# Patient Record
Sex: FEMALE | Race: WHITE | Employment: UNEMPLOYED | URBAN - METROPOLITAN AREA
[De-identification: names, ages, dates, MRNs, and addresses within clinical notes are randomized per-mention and may not be internally consistent; named-entity substitution may affect disease eponyms.]

---

## 2017-01-10 DIAGNOSIS — F41.9 ANXIETY: ICD-10-CM

## 2017-01-10 RX ORDER — ESCITALOPRAM OXALATE 20 MG/1
TABLET ORAL
Qty: 30 TAB | Refills: 3 | Status: SHIPPED | OUTPATIENT
Start: 2017-01-10 | End: 2017-03-30 | Stop reason: SDUPTHER

## 2017-03-30 ENCOUNTER — OFFICE VISIT (OUTPATIENT)
Dept: INTERNAL MEDICINE CLINIC | Age: 52
End: 2017-03-30

## 2017-03-30 VITALS
TEMPERATURE: 99.4 F | SYSTOLIC BLOOD PRESSURE: 112 MMHG | DIASTOLIC BLOOD PRESSURE: 82 MMHG | RESPIRATION RATE: 16 BRPM | HEART RATE: 75 BPM | WEIGHT: 136.8 LBS | BODY MASS INDEX: 20.26 KG/M2 | HEIGHT: 69 IN | OXYGEN SATURATION: 98 %

## 2017-03-30 DIAGNOSIS — J30.1 SEASONAL ALLERGIC RHINITIS DUE TO POLLEN: ICD-10-CM

## 2017-03-30 DIAGNOSIS — F41.9 ANXIETY: Primary | ICD-10-CM

## 2017-03-30 DIAGNOSIS — Z13.9 SCREENING: ICD-10-CM

## 2017-03-30 RX ORDER — PROPRANOLOL HYDROCHLORIDE 10 MG/1
10 TABLET ORAL
Qty: 30 TAB | Refills: 0 | Status: SHIPPED | OUTPATIENT
Start: 2017-03-30 | End: 2018-06-07 | Stop reason: ALTCHOICE

## 2017-03-30 RX ORDER — BISMUTH SUBSALICYLATE 262 MG
1 TABLET,CHEWABLE ORAL DAILY
COMMUNITY

## 2017-03-30 RX ORDER — CLONAZEPAM 0.5 MG/1
TABLET ORAL
Qty: 30 TAB | Refills: 3 | Status: SHIPPED | OUTPATIENT
Start: 2017-03-30 | End: 2017-10-02 | Stop reason: SDUPTHER

## 2017-03-30 RX ORDER — ALPRAZOLAM 0.5 MG/1
0.5 TABLET ORAL
Qty: 20 TAB | Refills: 0 | Status: SHIPPED | OUTPATIENT
Start: 2017-03-30 | End: 2017-11-21 | Stop reason: SDUPTHER

## 2017-03-30 RX ORDER — ESCITALOPRAM OXALATE 20 MG/1
TABLET ORAL
Qty: 30 TAB | Refills: 5 | Status: SHIPPED | OUTPATIENT
Start: 2017-03-30 | End: 2017-09-05 | Stop reason: SDUPTHER

## 2017-03-30 NOTE — MR AVS SNAPSHOT
Visit Information Date & Time Provider Department Dept. Phone Encounter #  
 3/30/2017  2:45 PM Sabrina Mosley MD [facility] [address] [id number] Upcoming Health Maintenance Date Due FOBT Q 1 YEAR AGE 50-75 9/11/2016 BREAST CANCER SCRN MAMMOGRAM 8/8/2018 PAP AKA CERVICAL CYTOLOGY 9/27/2019 DTaP/Tdap/Td series (2 - Td) 5/17/2026 Allergies as of 3/30/2017  Review Complete On: 3/30/2017 By: Sabrina Mosley MD  
  
 Severity Noted Reaction Type Reactions Sulfa (Sulfonamide Antibiotics)  02/19/2010    Hives Vancomycin  02/19/2010    Rash Current Immunizations  Reviewed on 5/17/2016 Name Date Tdap 5/17/2016 Not reviewed this visit You Were Diagnosed With   
  
 Codes Comments Screening    -  Primary ICD-10-CM: Z13.9 ICD-9-CM: V82.9 Anxiety     ICD-10-CM: F41.9 ICD-9-CM: 300.00 Vitals BP Pulse Temp Resp Height(growth percentile) Weight(growth percentile) 112/82 (BP 1 Location: Left arm, BP Patient Position: Sitting) 75 99.4 °F (37.4 °C) (Oral) 16 5' 9\" (1.753 m) 136 lb 12.8 oz (62.1 kg) LMP SpO2 BMI OB Status Smoking Status 03/23/2017 (Approximate) 98% 20.2 kg/m2 Having regular periods Never Smoker Vitals History BMI and BSA Data Body Mass Index Body Surface Area  
 20.2 kg/m 2 1.74 m 2 Preferred Pharmacy [facility] [address] [phone] Your Updated Medication List  
  
   
This list is accurate as of: 3/30/17  3:27 PM.  Always use your most recent med list. ACZONE 5 % Gel Generic drug:  Dapsone  
by Apply Externally route. albuterol 90 mcg/actuation inhaler Commonly known as:  PROVENTIL HFA, VENTOLIN HFA, PROAIR HFA Take 2 Puffs by inhalation every six (6) hours as needed for Wheezing for 360 days. ALPRAZolam 0.5 mg tablet Commonly known as:  Gopal Flores Take 1 Tab by mouth daily as needed for Anxiety. clonazePAM 0.5 mg tablet Commonly known as:  KlonoPIN  
TAKE ONE TABLET BY MOUTH ONE TIME DAILY FOR 30 DAYS  
  
 doxycycline hyclate 100 mg Tbec Take  by mouth as needed. escitalopram oxalate 20 mg tablet Commonly known as:  Vani Rivas TAKE ONE TABLET BY MOUTH ONE TIME DAILY  
  
 multivitamin tablet Commonly known as:  ONE A DAY Take 1 Tab by mouth daily. propranolol 10 mg tablet Commonly known as:  INDERAL Take 1 Tab by mouth daily as needed for up to 30 doses. TAZORAC 0.1 % topical cream  
Generic drug:  tazorotene Prescriptions Printed Refills  
 clonazePAM (KLONOPIN) 0.5 mg tablet 3 Sig: TAKE ONE TABLET BY MOUTH ONE TIME DAILY FOR 30 DAYS Class: Print ALPRAZolam (XANAX) 0.5 mg tablet 0 Sig: Take 1 Tab by mouth daily as needed for Anxiety. Class: Print Route: Oral  
  
Prescriptions Sent to Pharmacy Refills  
 escitalopram oxalate (LEXAPRO) 20 mg tablet 5 Sig: TAKE ONE TABLET BY MOUTH ONE TIME DAILY Class: Normal  
 Pharmacy: [facility] [address] [place] [address] Ph #: [phone]  
 propranolol (INDERAL) 10 mg tablet 0 Sig: Take 1 Tab by mouth daily as needed for up to 30 doses. Class: Normal  
 Pharmacy: [facility] [address] [place] [address] Ph #: [phone] Route: Oral  
  
We Performed the Following CBC WITH AUTOMATED DIFF [65107 CPT(R)] LIPID PANEL [54933 CPT(R)] METABOLIC PANEL, COMPREHENSIVE [03201 CPT(R)] TSH 3RD GENERATION [30607 CPT(R)] Introducing [facility]! Wanda Cassidy introduces [employer] patient portal. Now you can access parts of your medical record, email your doctor's office, and request medication refills online. 1. In your internet browser, go to [email or web address] 2. Click on the First Time User? Click Here link in the Sign In box.  You will see the New Member Sign Up page. 3. Enter your [employer] Access Code exactly as it appears below. You will not need to use this code after youve completed the sign-up process. If you do not sign up before the expiration date, you must request a new code. · [employer] Access Code: [email or web address] Expires: 6/28/2017  3:27 PM 
 
4. Enter the last four digits of your Social Security Number (xxxx) and Date of Birth (mm/dd/yyyy) as indicated and click Submit. You will be taken to the next sign-up page. 5. Create a Koalityt ID. This will be your [employer] login ID and cannot be changed, so think of one that is secure and easy to remember. 6. Create a [employer] password. You can change your password at any time. 7. Enter your Password Reset Question and Answer. This can be used at a later time if you forget your password. 8. Enter your e-mail address. You will receive e-mail notification when new information is available in [address] 9. Click Sign Up. You can now view and download portions of your medical record. 10. Click the Download Summary menu link to download a portable copy of your medical information. If you have questions, please visit the Frequently Asked Questions section of the [employer] website. Remember, [employer] is NOT to be used for urgent needs. For medical emergencies, dial 911. Now available from your iPhone and Android! Please provide this summary of care documentation to your next provider. Your primary care clinician is listed as Cedrick 68. If you have any questions after today's visit, please call [phone].

## 2017-03-30 NOTE — PROGRESS NOTES
HISTORY OF PRESENT ILLNESS  Alvaro Bowen is a 49 y.o. female. JINA Wilkerson comes in for med check. On reviewing her chart, for the last two years and multiple lab orders she's never gone to have blood drawn. Vinayak Unger discussion today that it is important to me that she get the labs drawn so that I can monitor her kidney and liver function. She refuses to do it today, but promises that she will go within the next week. Again, herman discussion that if she does not have it drawn I will not be able to continue to prescribe her meds.  is reviewed. Meds have only been prescribed by me over the last year and she has gone through 30 Klonopin every month and a half roughly. Last 20 Xanax were filled on 10/03/16 and she still has three pills left. She is taking the Lexapro 20 a day and feels that it is overall helpful. She has a sensation of globus in the back of her throat and some allergies and postnasal drainage. Review of Systems   Constitutional: Positive for malaise/fatigue. Negative for fever. Neurological: Negative for headaches. Endo/Heme/Allergies: Positive for environmental allergies. Psychiatric/Behavioral: Negative for depression. The patient is nervous/anxious. The patient does not have insomnia. Physical Exam   Constitutional: She appears well-developed and well-nourished. HENT:   Head: Normocephalic. Right Ear: Tympanic membrane, external ear and ear canal normal.   Left Ear: Tympanic membrane, external ear and ear canal normal.   Nose: Nose normal.   Mouth/Throat: Mucous membranes are normal. No oropharyngeal exudate. Some evidence of post nasal drainage   Eyes: Conjunctivae are normal. Pupils are equal, round, and reactive to light. Right eye exhibits no discharge. Left eye exhibits no discharge. Neck: Normal range of motion. Neck supple. Cardiovascular: Normal rate, regular rhythm and normal heart sounds.     Pulmonary/Chest: Effort normal and breath sounds normal. No respiratory distress. She has no wheezes. She has no rales. Lymphadenopathy:     She has no cervical adenopathy. Skin: Skin is warm and dry. Psychiatric: She has a normal mood and affect. Her behavior is normal.   Nursing note and vitals reviewed. ASSESSMENT and PLAN  Rita Vasquez was seen today for medication evaluation. Diagnoses and all orders for this visit:    Screening  -     METABOLIC PANEL, COMPREHENSIVE  -     LIPID PANEL  -     CBC WITH AUTOMATED DIFF  -     TSH 3RD GENERATION    Anxiety  -     escitalopram oxalate (LEXAPRO) 20 mg tablet; TAKE ONE TABLET BY MOUTH ONE TIME DAILY  -     clonazePAM (KLONOPIN) 0.5 mg tablet; TAKE ONE TABLET BY MOUTH ONE TIME DAILY FOR 30 DAYS  -     ALPRAZolam (XANAX) 0.5 mg tablet; Take 1 Tab by mouth daily as needed for Anxiety. -     propranolol (INDERAL) 10 mg tablet; Take 1 Tab by mouth daily as needed for up to 30 doses. Seasonal allergic rhinitis due to pollen    [facility] to use otc allegra  appt in 4 moThis note will not be viewable in UseTogetherhart.

## 2017-05-25 ENCOUNTER — TELEPHONE (OUTPATIENT)
Dept: INTERNAL MEDICINE CLINIC | Age: 52
End: 2017-05-25

## 2017-05-25 DIAGNOSIS — D72.819 LEUKOPENIA, UNSPECIFIED TYPE: Primary | ICD-10-CM

## 2017-05-25 LAB
ALBUMIN SERPL-MCNC: 4.2 G/DL (ref 3.5–5.5)
ALBUMIN/GLOB SERPL: 1.8 {RATIO} (ref 1.2–2.2)
ALP SERPL-CCNC: 37 IU/L (ref 39–117)
ALT SERPL-CCNC: 7 IU/L (ref 0–32)
AST SERPL-CCNC: 14 IU/L (ref 0–40)
BASOPHILS # BLD AUTO: 0 X10E3/UL (ref 0–0.2)
BASOPHILS NFR BLD AUTO: 0 %
BILIRUB SERPL-MCNC: 0.8 MG/DL (ref 0–1.2)
BUN SERPL-MCNC: 12 MG/DL (ref 6–24)
BUN/CREAT SERPL: 14 (ref 9–23)
CALCIUM SERPL-MCNC: 9.3 MG/DL (ref 8.7–10.2)
CHLORIDE SERPL-SCNC: 98 MMOL/L (ref 96–106)
CHOLEST SERPL-MCNC: 196 MG/DL (ref 100–199)
CO2 SERPL-SCNC: 25 MMOL/L (ref 18–29)
CREAT SERPL-MCNC: 0.83 MG/DL (ref 0.57–1)
EOSINOPHIL # BLD AUTO: 0.1 X10E3/UL (ref 0–0.4)
EOSINOPHIL NFR BLD AUTO: 3 %
ERYTHROCYTE [DISTWIDTH] IN BLOOD BY AUTOMATED COUNT: 15.5 % (ref 12.3–15.4)
GLOBULIN SER CALC-MCNC: 2.4 G/DL (ref 1.5–4.5)
GLUCOSE SERPL-MCNC: 87 MG/DL (ref 65–99)
HCT VFR BLD AUTO: 32 % (ref 34–46.6)
HDLC SERPL-MCNC: 107 MG/DL
HGB BLD-MCNC: 10 G/DL (ref 11.1–15.9)
IMM GRANULOCYTES # BLD: 0 X10E3/UL (ref 0–0.1)
IMM GRANULOCYTES NFR BLD: 0 %
INTERPRETATION, 910389: NORMAL
LDLC SERPL CALC-MCNC: 75 MG/DL (ref 0–99)
LYMPHOCYTES # BLD AUTO: 1.2 X10E3/UL (ref 0.7–3.1)
LYMPHOCYTES NFR BLD AUTO: 47 %
MCH RBC QN AUTO: 27 PG (ref 26.6–33)
MCHC RBC AUTO-ENTMCNC: 31.3 G/DL (ref 31.5–35.7)
MCV RBC AUTO: 87 FL (ref 79–97)
MONOCYTES # BLD AUTO: 0.4 X10E3/UL (ref 0.1–0.9)
MONOCYTES NFR BLD AUTO: 16 %
MORPHOLOGY BLD-IMP: ABNORMAL
NEUTROPHILS # BLD AUTO: 0.9 X10E3/UL (ref 1.4–7)
NEUTROPHILS NFR BLD AUTO: 34 %
PLATELET # BLD AUTO: 207 X10E3/UL (ref 150–379)
POTASSIUM SERPL-SCNC: 4.1 MMOL/L (ref 3.5–5.2)
PROT SERPL-MCNC: 6.6 G/DL (ref 6–8.5)
RBC # BLD AUTO: 3.7 X10E6/UL (ref 3.77–5.28)
SODIUM SERPL-SCNC: 137 MMOL/L (ref 134–144)
TRIGL SERPL-MCNC: 71 MG/DL (ref 0–149)
TSH SERPL DL<=0.005 MIU/L-ACNC: 6.01 UIU/ML (ref 0.45–4.5)
VLDLC SERPL CALC-MCNC: 14 MG/DL (ref 5–40)
WBC # BLD AUTO: 2.5 X10E3/UL (ref 3.4–10.8)

## 2017-05-26 ENCOUNTER — TELEPHONE (OUTPATIENT)
Dept: INTERNAL MEDICINE CLINIC | Age: 52
End: 2017-05-26

## 2017-05-30 ENCOUNTER — TELEPHONE (OUTPATIENT)
Dept: INTERNAL MEDICINE CLINIC | Age: 52
End: 2017-05-30

## 2017-05-30 NOTE — TELEPHONE ENCOUNTER
Pt dropped off form for presciption mattress to be sogned by Dr Sonali Lopez. Pt will  from office when ready. I placed form with note attached in [facility] 96..

## 2017-05-31 ENCOUNTER — TELEPHONE (OUTPATIENT)
Dept: INTERNAL MEDICINE CLINIC | Age: 52
End: 2017-05-31

## 2017-05-31 LAB
BASOPHILS # BLD AUTO: 0 X10E3/UL (ref 0–0.2)
BASOPHILS NFR BLD AUTO: 1 %
EOSINOPHIL # BLD AUTO: 0.1 X10E3/UL (ref 0–0.4)
EOSINOPHIL NFR BLD AUTO: 4 %
ERYTHROCYTE [DISTWIDTH] IN BLOOD BY AUTOMATED COUNT: 16.2 % (ref 12.3–15.4)
HCT VFR BLD AUTO: 34.3 % (ref 34–46.6)
HGB BLD-MCNC: 10.6 G/DL (ref 11.1–15.9)
IMM GRANULOCYTES # BLD: 0 X10E3/UL (ref 0–0.1)
IMM GRANULOCYTES NFR BLD: 0 %
LYMPHOCYTES # BLD AUTO: 0.9 X10E3/UL (ref 0.7–3.1)
LYMPHOCYTES NFR BLD AUTO: 32 %
MCH RBC QN AUTO: 26.8 PG (ref 26.6–33)
MCHC RBC AUTO-ENTMCNC: 30.9 G/DL (ref 31.5–35.7)
MCV RBC AUTO: 87 FL (ref 79–97)
MONOCYTES # BLD AUTO: 0.3 X10E3/UL (ref 0.1–0.9)
MONOCYTES NFR BLD AUTO: 13 %
NEUTROPHILS # BLD AUTO: 1.4 X10E3/UL (ref 1.4–7)
NEUTROPHILS NFR BLD AUTO: 50 %
PLATELET # BLD AUTO: 223 X10E3/UL (ref 150–379)
RBC # BLD AUTO: 3.96 X10E6/UL (ref 3.77–5.28)
WBC # BLD AUTO: 2.7 X10E3/UL (ref 3.4–10.8)

## 2017-05-31 NOTE — TELEPHONE ENCOUNTER
Patient is scheduled to see Oscar Blackwood on June 14th at 2:00. They will mail her a new patient packet out for her to complete prior to appt. Please bring copay, insurance card & photo i.d & arrive 20 min early for check-in.

## 2017-05-31 NOTE — TELEPHONE ENCOUNTER
Left her a detailed message re the appt-please call her later in week to be sure she got this info thanks

## 2017-06-02 ENCOUNTER — TELEPHONE (OUTPATIENT)
Dept: INTERNAL MEDICINE CLINIC | Age: 52
End: 2017-06-02

## 2017-06-14 ENCOUNTER — OFFICE VISIT (OUTPATIENT)
Dept: ONCOLOGY | Age: 52
End: 2017-06-14

## 2017-06-14 ENCOUNTER — HOSPITAL ENCOUNTER (OUTPATIENT)
Dept: INFUSION THERAPY | Age: 52
Discharge: HOME | End: 2017-06-14
Payer: COMMERCIAL

## 2017-06-14 VITALS
OXYGEN SATURATION: 98 % | WEIGHT: 135.8 LBS | RESPIRATION RATE: 18 BRPM | BODY MASS INDEX: 20.11 KG/M2 | HEIGHT: 69 IN | DIASTOLIC BLOOD PRESSURE: 76 MMHG | SYSTOLIC BLOOD PRESSURE: 101 MMHG | TEMPERATURE: 97.2 F | HEART RATE: 75 BPM

## 2017-06-14 VITALS
HEART RATE: 66 BPM | DIASTOLIC BLOOD PRESSURE: 81 MMHG | TEMPERATURE: 97 F | SYSTOLIC BLOOD PRESSURE: 117 MMHG | RESPIRATION RATE: 16 BRPM

## 2017-06-14 DIAGNOSIS — D64.9 ANEMIA, UNSPECIFIED TYPE: ICD-10-CM

## 2017-06-14 DIAGNOSIS — E03.9 HYPOTHYROIDISM, UNSPECIFIED TYPE: ICD-10-CM

## 2017-06-14 DIAGNOSIS — D70.9 NEUTROPENIA, UNSPECIFIED TYPE (HCC): Primary | ICD-10-CM

## 2017-06-14 LAB
CRP SERPL-MCNC: <0.29 MG/DL
ERYTHROCYTE [SEDIMENTATION RATE] IN BLOOD: 7 MM/HR (ref 0–30)
FERRITIN SERPL-MCNC: 5 NG/ML (ref 8–252)
FOLATE SERPL-MCNC: 45.6 NG/ML (ref 5–21)
HAPTOGLOB SERPL-MCNC: 84 MG/DL (ref 30–200)
IRON SATN MFR SERPL: 8 % (ref 20–50)
IRON SERPL-MCNC: 32 UG/DL (ref 35–150)
LDH SERPL L TO P-CCNC: 132 U/L (ref 81–246)
RETICS/RBC NFR AUTO: 0.9 % (ref 0.7–2.1)
TIBC SERPL-MCNC: 425 UG/DL (ref 250–450)
VIT B12 SERPL-MCNC: 264 PG/ML (ref 211–911)

## 2017-06-14 PROCEDURE — 85652 RBC SED RATE AUTOMATED: CPT | Performed by: INTERNAL MEDICINE

## 2017-06-14 PROCEDURE — 85025 COMPLETE CBC W/AUTO DIFF WBC: CPT | Performed by: INTERNAL MEDICINE

## 2017-06-14 PROCEDURE — 85045 AUTOMATED RETICULOCYTE COUNT: CPT | Performed by: INTERNAL MEDICINE

## 2017-06-14 PROCEDURE — 82607 VITAMIN B-12: CPT | Performed by: INTERNAL MEDICINE

## 2017-06-14 PROCEDURE — 86140 C-REACTIVE PROTEIN: CPT | Performed by: INTERNAL MEDICINE

## 2017-06-14 PROCEDURE — 83540 ASSAY OF IRON: CPT | Performed by: INTERNAL MEDICINE

## 2017-06-14 PROCEDURE — 83615 LACTATE (LD) (LDH) ENZYME: CPT | Performed by: INTERNAL MEDICINE

## 2017-06-14 PROCEDURE — 36415 COLL VENOUS BLD VENIPUNCTURE: CPT | Performed by: INTERNAL MEDICINE

## 2017-06-14 PROCEDURE — 82728 ASSAY OF FERRITIN: CPT | Performed by: INTERNAL MEDICINE

## 2017-06-14 PROCEDURE — 82746 ASSAY OF FOLIC ACID SERUM: CPT | Performed by: INTERNAL MEDICINE

## 2017-06-14 PROCEDURE — 83010 ASSAY OF HAPTOGLOBIN QUANT: CPT | Performed by: INTERNAL MEDICINE

## 2017-06-14 PROCEDURE — 84155 ASSAY OF PROTEIN SERUM: CPT | Performed by: INTERNAL MEDICINE

## 2017-06-14 NOTE — MR AVS SNAPSHOT
Visit Information Date & Time Provider Department Dept. Phone Encounter #  
 6/14/2017  2:00 PM MD [facility] at [address] [place] [address] Rd [id number] Follow-up Instructions Return in about 3 months (around 9/14/2017). Your Appointments 9/19/2017  8:00 AM  
ESTABLISHED PATIENT with MD Miroslava Tee Oncology at [facility]) Appt Note: 3 mo f/u  
 [address] [facility] [id number]  
[phone]  
  
   
 [address] [facility], [address] [facility] Upcoming Health Maintenance Date Due Pneumococcal 19-64 Highest Risk (1 of 3 - PCV13) 9/11/1985 FOBT Q 1 YEAR AGE 50-75 9/11/2016 INFLUENZA AGE 9 TO ADULT 8/1/2017 BREAST CANCER SCRN MAMMOGRAM 8/8/2018 PAP AKA CERVICAL CYTOLOGY 9/27/2019 DTaP/Tdap/Td series (2 - Td) 5/17/2026 Allergies as of 6/14/2017  Review Complete On: 6/14/2017 By: Artie Rowley RN Severity Noted Reaction Type Reactions Sulfa (Sulfonamide Antibiotics)  02/19/2010    Hives Vancomycin  02/19/2010    Rash Current Immunizations  Reviewed on 5/17/2016 Name Date Tdap 5/17/2016 Not reviewed this visit You Were Diagnosed With   
  
 Codes Comments Neutropenia, unspecified type ([facility]    -  Primary ICD-10-CM: D70.9 ICD-9-CM: 288.00 Anemia, unspecified type     ICD-10-CM: D64.9 ICD-9-CM: 357. 9 Vitals BP Pulse Temp Resp Height(growth percentile) Weight(growth percentile) 101/76 75 97.2 °F (36.2 °C) (Temporal) 18 5' 9\" (1.753 m) 135 lb 12.8 oz (61.6 kg) LMP SpO2 BMI OB Status Smoking Status 06/03/2017 98% 20.05 kg/m2 Having regular periods Never Smoker Vitals History BMI and BSA Data Body Mass Index Body Surface Area 20.05 kg/m 2 1.73 m 2 Preferred [facility]acy [facility] [address] [phone] Your Updated Medication List  
  
   
This list is accurate as of: 6/14/17  3:17 PM.  Always use your most recent med list. ACZONE 5 % Gel Generic drug:  Dapsone  
by Apply Externally route. albuterol 90 mcg/actuation inhaler Commonly known as:  PROVENTIL HFA, VENTOLIN HFA, PROAIR HFA Take 2 Puffs by inhalation every six (6) hours as needed for Wheezing for 360 days. ALPRAZolam 0.5 mg tablet Commonly known as:  Denisa Brawn Take 1 Tab by mouth daily as needed for Anxiety. clonazePAM 0.5 mg tablet Commonly known as:  KlonoPIN  
TAKE ONE TABLET BY MOUTH ONE TIME DAILY FOR 30 DAYS  
  
 doxycycline hyclate 100 mg Tbec Take  by mouth as needed. escitalopram oxalate 20 mg tablet Commonly known as:  Jesus Blowers TAKE ONE TABLET BY MOUTH ONE TIME DAILY  
  
 multivitamin tablet Commonly known as:  ONE A DAY Take 1 Tab by mouth daily. propranolol 10 mg tablet Commonly known as:  INDERAL Take 1 Tab by mouth daily as needed for up to 30 doses. TAZORAC 0.1 % topical cream  
Generic drug:  tazorotene  
daily. Follow-up Instructions Return in about 3 months (around 9/14/2017). Introducing [facility]! Rachel Seaman introduces [employer] patient portal. Now you can access parts of your medical record, email your doctor's office, and request medication refills online. 1. In your internet browser, go to [email or web address] 2. Click on the First Time User? Click Here link in the Sign In box. You will see the New Member Sign Up page. 3. Enter your [employer] Access Code exactly as it appears below. You will not need to use this code after youve completed the sign-up process. If you do not sign up before the expiration date, you must request a new code. · [employer] Access Code: [email or web address] Expires: 6/28/2017  3:27 PM 
 
 4. Enter the last four digits of your Social Security Number (xxxx) and Date of Birth (mm/dd/yyyy) as indicated and click Submit. You will be taken to the next sign-up page. 5. Create a [employer] ID. This will be your [employer] login ID and cannot be changed, so think of one that is secure and easy to remember. 6. Create a [employer] password. You can change your password at any time. 7. Enter your Password Reset Question and Answer. This can be used at a later time if you forget your password. 8. Enter your e-mail address. You will receive e-mail notification when new information is available in [address] 9. Click Sign Up. You can now view and download portions of your medical record. 10. Click the Download Summary menu link to download a portable copy of your medical information. If you have questions, please visit the Frequently Asked Questions section of the [employer] website. Remember, [employer] is NOT to be used for urgent needs. For medical emergencies, dial 911. Now available from your iPhone and Android! Please provide this summary of care documentation to your next provider. Your primary care clinician is listed as Cedrick 68. If you have any questions after today's visit, please call [phone].

## 2017-06-14 NOTE — PROGRESS NOTES
[facility] LAB NOTE    Pt arrived at 1540left at 1547(time)    Blood pressure 117/81, pulse 66, temperature 97 °F (36.1 °C), resp. rate 16, last menstrual period 06/03/2017.       Labs drawn peripherally as ordered    Lab will be in [facility] care

## 2017-06-15 LAB
BASOPHILS # BLD AUTO: 0 K/UL (ref 0–0.1)
BASOPHILS # BLD: 1 % (ref 0–1)
DIFFERENTIAL METHOD BLD: ABNORMAL
EOSINOPHIL # BLD: 0 K/UL (ref 0–0.4)
EOSINOPHIL NFR BLD: 1 % (ref 0–7)
ERYTHROCYTE [DISTWIDTH] IN BLOOD BY AUTOMATED COUNT: 16.6 % (ref 11.5–14.5)
HCT VFR BLD AUTO: 33.4 % (ref 35–47)
HGB BLD-MCNC: 10.5 G/DL (ref 11.5–16)
LYMPHOCYTES # BLD AUTO: 43 % (ref 12–49)
LYMPHOCYTES # BLD: 0.9 K/UL (ref 0.8–3.5)
MCH RBC QN AUTO: 26.6 PG (ref 26–34)
MCHC RBC AUTO-ENTMCNC: 31.4 G/DL (ref 30–36.5)
MCV RBC AUTO: 84.8 FL (ref 80–99)
MONOCYTES # BLD: 0.2 K/UL (ref 0–1)
MONOCYTES NFR BLD AUTO: 9 % (ref 5–13)
NEUTS SEG # BLD: 0.9 K/UL (ref 1.8–8)
NEUTS SEG NFR BLD AUTO: 46 % (ref 32–75)
PATH REV BLD -IMP: ABNORMAL
PLATELET # BLD AUTO: 211 K/UL (ref 150–400)
RBC # BLD AUTO: 3.94 M/UL (ref 3.8–5.2)
RBC MORPH BLD: ABNORMAL
RBC MORPH BLD: ABNORMAL
WBC # BLD AUTO: 2 K/UL (ref 3.6–11)

## 2017-06-16 ENCOUNTER — TELEPHONE (OUTPATIENT)
Dept: ONCOLOGY | Age: 52
End: 2017-06-16

## 2017-06-16 LAB
ALBUMIN SERPL ELPH-MCNC: 4.3 G/DL (ref 2.9–4.4)
ALBUMIN/GLOB SERPL: 1.4 {RATIO} (ref 0.7–1.7)
ALPHA1 GLOB SERPL ELPH-MCNC: 0.2 G/DL (ref 0–0.4)
ALPHA2 GLOB SERPL ELPH-MCNC: 0.6 G/DL (ref 0.4–1)
B-GLOBULIN SERPL ELPH-MCNC: 1.1 G/DL (ref 0.7–1.3)
GAMMA GLOB SERPL ELPH-MCNC: 1.1 G/DL (ref 0.4–1.8)
GLOBULIN SER CALC-MCNC: 3 G/DL (ref 2.2–3.9)
M PROTEIN SERPL ELPH-MCNC: NORMAL G/DL
PROT SERPL-MCNC: 7.3 G/DL (ref 6–8.5)

## 2017-06-22 DIAGNOSIS — D50.9 IRON DEFICIENCY ANEMIA, UNSPECIFIED IRON DEFICIENCY ANEMIA TYPE: Primary | ICD-10-CM

## 2017-06-22 NOTE — TELEPHONE ENCOUNTER
Spoke to patient and advised her of lab results below as per JEFE Lucio NP. Advised patient to begin taking iron tablets 325 mg twice daily and that a referral had been placed for her to see a GI specialist. Patient states she is in classes the entire month of July and then starts a new job in August and would prefer to hold off on the GI consult at this time. Advised patient that she needed to have her labs re-checked in 2 months and follow-up in office as well. Patient voices understanding and states she will call our office back to schedule lab and office appointments. Patient denies any further questions at this time.

## 2017-06-22 NOTE — PROGRESS NOTES
Her labs look good other than her iron is low. Have her start iron tablets 325 mg BID at home from OTC. I will also refer her to GI to make sure she is not losing blood that way. Have her see us in 2 months with CBC, iron profile and ferritin. Thanks!

## 2017-06-22 NOTE — TELEPHONE ENCOUNTER
----- Message from Reinaldo Islas NP sent at 6/22/2017  9:59 AM EDT -----  Her labs look good other than her iron is low. Have her start iron tablets 325 mg BID at home from OTC. I will also refer her to GI to make sure she is not losing blood that way. Have her see us in 2 months with CBC, iron profile and ferritin. Thanks!

## 2017-09-05 DIAGNOSIS — F41.9 ANXIETY: ICD-10-CM

## 2017-09-05 RX ORDER — ESCITALOPRAM OXALATE 20 MG/1
TABLET ORAL
Qty: 90 TAB | Refills: 1 | Status: SHIPPED | OUTPATIENT
Start: 2017-09-05 | End: 2017-11-17 | Stop reason: SDUPTHER

## 2017-10-02 DIAGNOSIS — F41.9 ANXIETY: ICD-10-CM

## 2017-10-02 RX ORDER — CLONAZEPAM 0.5 MG/1
TABLET ORAL
Qty: 30 TAB | Refills: 0 | OUTPATIENT
Start: 2017-10-02 | End: 2017-11-21 | Stop reason: SDUPTHER

## 2017-10-02 NOTE — TELEPHONE ENCOUNTER
Patient is out of medication tomorrow and has set up an appt for 11/2/17 and she needs enough medication called in until she can get to the appt. She would like a callback if this can't be done and talk to the nurse.

## 2017-11-17 ENCOUNTER — TELEPHONE (OUTPATIENT)
Dept: INTERNAL MEDICINE CLINIC | Age: 52
End: 2017-11-17

## 2017-11-17 DIAGNOSIS — F41.9 ANXIETY: ICD-10-CM

## 2017-11-17 RX ORDER — ESCITALOPRAM OXALATE 20 MG/1
TABLET ORAL
Qty: 7 TAB | Refills: 0 | Status: SHIPPED | OUTPATIENT
Start: 2017-11-17 | End: 2017-11-21 | Stop reason: SDUPTHER

## 2017-11-17 NOTE — TELEPHONE ENCOUNTER
Patient is in [place] and she forgot her lexapro. She needs enough to get her through 7 days. Call [facility] [phone]. She says she can't be without it.

## 2017-11-17 NOTE — TELEPHONE ENCOUNTER
Rx was already sent. See the previous refill encounter dated for today's date. Patient was provided a 7 day supply.

## 2017-11-17 NOTE — TELEPHONE ENCOUNTER
----- Message from Octavio Ying sent at 11/17/2017  9:43 AM EST -----  Regarding: Dr. Alis zuniga  Pt received a letter in the mail from [facility] that she has been terminated from the practice. Pt called a week ago requesting a call back from a manager to discuss why, but she never received a call back. Pt is currently in [place], [place] and forgot her medication Lexapro (generic form) and does not have any with her. Pt is concerned,because she has to take this medication everyday. Pt would like a call back from Dr. Imani Matute nurse to discuss getting a prescription sent to a pharmacy in [place] to last her until she returns to [place], [place]. Pt can be reached at [phone].

## 2017-11-21 ENCOUNTER — OFFICE VISIT (OUTPATIENT)
Dept: INTERNAL MEDICINE CLINIC | Facility: CLINIC | Age: 52
End: 2017-11-21

## 2017-11-21 VITALS
HEIGHT: 69 IN | DIASTOLIC BLOOD PRESSURE: 78 MMHG | BODY MASS INDEX: 19.26 KG/M2 | SYSTOLIC BLOOD PRESSURE: 120 MMHG | WEIGHT: 130 LBS | RESPIRATION RATE: 18 BRPM | TEMPERATURE: 98.4 F | HEART RATE: 67 BPM

## 2017-11-21 DIAGNOSIS — F41.9 ANXIETY: Primary | ICD-10-CM

## 2017-11-21 DIAGNOSIS — F32.A DEPRESSION, UNSPECIFIED DEPRESSION TYPE: ICD-10-CM

## 2017-11-21 RX ORDER — ALPRAZOLAM 0.5 MG/1
0.5 TABLET ORAL
Qty: 30 TAB | Refills: 0 | Status: SHIPPED | OUTPATIENT
Start: 2017-11-21 | End: 2017-11-21 | Stop reason: SDUPTHER

## 2017-11-21 RX ORDER — ALPRAZOLAM 0.5 MG/1
0.5 TABLET ORAL
Qty: 30 TAB | Refills: 0 | Status: SHIPPED | OUTPATIENT
Start: 2017-11-21 | End: 2018-01-08 | Stop reason: SDUPTHER

## 2017-11-21 RX ORDER — CLONAZEPAM 0.5 MG/1
TABLET ORAL
Qty: 30 TAB | Refills: 0 | Status: SHIPPED | OUTPATIENT
Start: 2017-11-21 | End: 2017-11-21 | Stop reason: SDUPTHER

## 2017-11-21 RX ORDER — ESCITALOPRAM OXALATE 20 MG/1
TABLET ORAL
Qty: 30 TAB | Refills: 5 | Status: SHIPPED | OUTPATIENT
Start: 2017-11-21 | End: 2018-03-16 | Stop reason: SDUPTHER

## 2017-11-21 RX ORDER — CLONAZEPAM 0.5 MG/1
TABLET ORAL
Qty: 30 TAB | Refills: 2 | Status: SHIPPED | OUTPATIENT
Start: 2017-11-21 | End: 2018-06-07 | Stop reason: SDUPTHER

## 2017-11-21 NOTE — MR AVS SNAPSHOT
Visit Information Date & Time Provider Department Dept. Phone Encounter #  
 11/21/2017  2:30 PM Rio Mcknight NP [facility] Internal Medicine [phone] [id number] Follow-up Instructions Return in about 6 months (around 5/21/2018) for Please sign record release forms at . Your Appointments 12/13/2017 11:00 AM  
ESTABLISHED PATIENT with MD Miroslava Santamaria Oncology at [facility]) Appt Note: 3 mo f/u; 3 mo f/u (Per Sierra); 3 mo f/u (Per Sierra); f/u  
 [address] [facility], [address] [facility] 99 [id number] [phone]  
  
   
 [address] [facility], [address] Upcoming Health Maintenance Date Due Pneumococcal 19-64 Highest Risk (1 of 3 - PCV13) 9/11/1985 FOBT Q 1 YEAR AGE 50-75 9/11/2016 BREAST CANCER SCRN MAMMOGRAM 8/8/2018 PAP AKA CERVICAL CYTOLOGY 9/27/2019 DTaP/Tdap/Td series (2 - Td) 5/17/2026 Allergies as of 11/21/2017  Review Complete On: 11/21/2017 By: Rio Mcknight NP Severity Noted Reaction Type Reactions Sulfa (Sulfonamide Antibiotics)  02/19/2010    Hives Vancomycin  02/19/2010    Rash Current Immunizations  Reviewed on 5/17/2016 Name Date Tdap 5/17/2016 Not reviewed this visit You Were Diagnosed With   
  
 Codes Comments Anxiety    -  Primary ICD-10-CM: F41.9 ICD-9-CM: 300.00 Depression, unspecified depression type     ICD-10-CM: F32.9 ICD-9-CM: 831 Vitals BP Pulse Temp Resp Height(growth percentile) Weight(growth percentile) 120/78 67 98.4 °F (36.9 °C) (Oral) 18 5' 9\" (1.753 m) 130 lb (59 kg) LMP BMI OB Status Smoking Status 11/01/2017 (Approximate) 19.2 kg/m2 Having regular periods Never Smoker Vitals History BMI and BSA Data Body Mass Index Body Surface Area  
 19.2 kg/m 2 1.69 m 2 Preferred Pharmacy Pharmacy Name Phone [facility] [address] [phone] Your Updated Medication List  
  
   
This list is accurate as of: 11/21/17  3:25 PM.  Always use your most recent med list. ACZONE 5 % Gel Generic drug:  Dapsone  
by Apply Externally route. albuterol 90 mcg/actuation inhaler Commonly known as:  PROVENTIL HFA, VENTOLIN HFA, PROAIR HFA Take 2 Puffs by inhalation every six (6) hours as needed for Wheezing for 360 days. ALPRAZolam 0.5 mg tablet Commonly known as:  Milad Cape Take 1 Tab by mouth daily as needed for Anxiety. clonazePAM 0.5 mg tablet Commonly known as:  KlonoPIN  
TAKE ONE TABLET BY MOUTH ONE TIME DAILY FOR 30 DAYS  
  
 doxycycline hyclate 100 mg Tbec Take  by mouth as needed. escitalopram oxalate 20 mg tablet Commonly known as:  Georgiann Bares TAKE ONE TABLET BY MOUTH ONE TIME DAILY  
  
 multivitamin tablet Commonly known as:  ONE A DAY Take 1 Tab by mouth daily. propranolol 10 mg tablet Commonly known as:  INDERAL Take 1 Tab by mouth daily as needed for up to 30 doses. TAZORAC 0.1 % topical cream  
Generic drug:  tazorotene  
daily. Prescriptions Printed Refills ALPRAZolam (XANAX) 0.5 mg tablet 0 Sig: Take 1 Tab by mouth daily as needed for Anxiety. Class: Print Route: Oral  
 clonazePAM (KLONOPIN) 0.5 mg tablet 0 Sig: TAKE ONE TABLET BY MOUTH ONE TIME DAILY FOR 30 DAYS Class: Print Prescriptions Sent to Pharmacy Refills  
 escitalopram oxalate (LEXAPRO) 20 mg tablet 5 Sig: TAKE ONE TABLET BY MOUTH ONE TIME DAILY Class: Normal  
 Pharmacy: [facility] - [place], [address]  [place]  #: [phone] Follow-up Instructions Return in about 6 months (around 5/21/2018) for Please sign record release forms at . Introducing [facility]! Marixa Presley introduces [employer] patient portal. Now you can access parts of your medical record, email your doctor's office, and request medication refills online. 1. In your internet browser, go to [email or web address] 2. Click on the First Time User? Click Here link in the Sign In box. You will see the New Member Sign Up page. 3. Enter your [employer] Access Code exactly as it appears below. You will not need to use this code after youve completed the sign-up process. If you do not sign up before the expiration date, you must request a new code. · [employer] Access Code: [email or web address] Expires: 2/19/2018  3:25 PM 
 
4. Enter the last four digits of your Social Security Number (xxxx) and Date of Birth (mm/dd/yyyy) as indicated and click Submit. You will be taken to the next sign-up page. 5. Create a [employer] ID. This will be your [employer] login ID and cannot be changed, so think of one that is secure and easy to remember. 6. Create a [employer] password. You can change your password at any time. 7. Enter your Password Reset Question and Answer. This can be used at a later time if you forget your password. 8. Enter your e-mail address. You will receive e-mail notification when new information is available in [address] 9. Click Sign Up. You can now view and download portions of your medical record. 10. Click the Download Summary menu link to download a portable copy of your medical information. If you have questions, please visit the Frequently Asked Questions section of the [employer] website. Remember, [employer] is NOT to be used for urgent needs. For medical emergencies, dial 911. Now available from your iPhone and Android! Please provide this summary of care documentation to your next provider. Your primary care clinician is listed as Cedrick 68. If you have any questions after today's visit, please call [phone].

## 2017-11-21 NOTE — PROGRESS NOTES
Subjective:      Ivon Veliz is a 47 y.o. female who is a new patient and is here to establish care and have medication refills. Previous followed by PCP Dr. Shira Og. The following sections were reviewed & updated as appropriate: PMH, PL, PSH, FH, RxH, and SH. She was previously by a psychiatrist in [place]. Mental Health Review  Patient is seen for anxiety disorder, depressoin. Since last visit: she has been on klonopin since 2006, she has been taking the lexapro since 2006. Ongoing symptoms include: panic attacks but they haven't been frequent, her last xanax fill was in April. She denies: SI/SA. Reported side effects from the treatment: none. She has tried other SSRIs and they have not been successful. [facility]. Patient Active Problem List    Diagnosis Date Noted    Depression 02/19/2010    Anxiety 02/19/2010    Environmental allergies 02/19/2010     Current Outpatient Prescriptions   Medication Sig Dispense Refill    escitalopram oxalate (LEXAPRO) 20 mg tablet TAKE ONE TABLET BY MOUTH ONE TIME DAILY 7 Tab 0    clonazePAM (KLONOPIN) 0.5 mg tablet TAKE ONE TABLET BY MOUTH ONE TIME DAILY FOR 30 DAYS 30 Tab 0    multivitamin (ONE A DAY) tablet Take 1 Tab by mouth daily.  ALPRAZolam (XANAX) 0.5 mg tablet Take 1 Tab by mouth daily as needed for Anxiety. 20 Tab 0    Dapsone (ACZONE) 5 % gel by Apply Externally route.  doxycycline hyclate 100 mg TbEC Take  by mouth as needed.  TAZORAC 0.1 % topical cream daily.  propranolol (INDERAL) 10 mg tablet Take 1 Tab by mouth daily as needed for up to 30 doses. 30 Tab 0    albuterol (PROVENTIL HFA, VENTOLIN HFA) 90 mcg/actuation inhaler Take 2 Puffs by inhalation every six (6) hours as needed for Wheezing for 360 days.  1 Inhaler 2     Allergies   Allergen Reactions    Sulfa (Sulfonamide Antibiotics) Hives    Vancomycin Rash     Past Medical History:   Diagnosis Date    Anxiety 2/19/2010    Depression 2/19/2010    Environmental allergies 2/19/2010     Family History   Problem Relation Age of Onset    Hypertension Father     Cancer Father 71     skin cancer    Cancer Paternal Grandmother      Breast     Social History   Substance Use Topics    Smoking status: Never Smoker    Smokeless tobacco: Never Used    Alcohol use 2.0 oz/week     4 Glasses of wine per week        Review of Systems    A comprehensive review of systems was negative except for that written in the HPI. Objective:     Visit Vitals    /78    Pulse 67    Temp 98.4 °F (36.9 °C) (Oral)    Resp 18    Ht 5' 9\" (1.753 m)    Wt 130 lb (59 kg)    LMP 11/01/2017 (Approximate)    BMI 19.2 kg/m2     General appearance: alert, cooperative, no distress, appears stated age  Head: Normocephalic, without obvious abnormality, atraumatic  Eyes: negative  Lungs: clear to auscultation bilaterally  Heart: regular rate and rhythm, S1, S2 normal, no murmur, click, rub or gallop  Skin: erythemic birth maria fernanda noted to anterior chest  Neurologic: Alert and oriented X 3, normal strength and tone. Normal symmetric reflexes. Normal coordination and gait  Psych: appropriate mood, speech, affect  Nursing note and vitals reviewed  Assessment/Plan:       ICD-10-CM ICD-9-CM    1. Anxiety F41.9 300.00 escitalopram oxalate (LEXAPRO) 20 mg tablet      ALPRAZolam (XANAX) 0.5 mg tablet      clonazePAM (KLONOPIN) 0.5 mg tablet      DISCONTINUED: ALPRAZolam (XANAX) 0.5 mg tablet      DISCONTINUED: clonazePAM (KLONOPIN) 0.5 mg tablet   2. Depression, unspecified depression type F32.9 311      Follow-up Disposition:  Return in about 6 months (around 5/21/2018) for Please sign record release forms at . Advised her to call back or return to office if symptoms worsen/change/persist.  Discussed expected course/resolution/complications of diagnosis in detail with patient. Medication risks/benefits/costs/interactions/alternatives discussed with patient.   She was given an after visit summary which includes diagnoses, current medications, & vitals. She expressed understanding with the diagnosis and plan.

## 2017-11-21 NOTE — PROGRESS NOTES
Chief Complaint   Patient presents with   [facility] Establish Care    Medication Refill

## 2017-12-12 ENCOUNTER — TELEPHONE (OUTPATIENT)
Dept: ONCOLOGY | Age: 52
End: 2017-12-12

## 2017-12-12 NOTE — TELEPHONE ENCOUNTER
Voicemail left requesting a call back.  Called patient to confirm that patient wanted to cancel appointment with Dr. Keisha Kelly on 12/13/17

## 2017-12-12 NOTE — TELEPHONE ENCOUNTER
Patient called and stated that she was retuning a call regarding her appointment. Patient verified that she would like to cancel her appointment for now and will call back to reschedule once she takes the iron she was supposed to. No further questions or concerns at this time.

## 2017-12-12 NOTE — TELEPHONE ENCOUNTER
Dr. Malik Dumont and Trupti Canada NP are aware of the patient cancelling her appointment and that she will call back to reschedule.

## 2018-01-08 DIAGNOSIS — F41.9 ANXIETY: ICD-10-CM

## 2018-01-08 NOTE — TELEPHONE ENCOUNTER
Patient stated she'll be in [place] on Wednesday and hopes to  her prescription. If there's any questions or comments, please contact patient before Tuesday evening.

## 2018-01-09 RX ORDER — ALPRAZOLAM 0.5 MG/1
0.5 TABLET ORAL
Qty: 30 TAB | Refills: 0 | Status: SHIPPED | OUTPATIENT
Start: 2018-01-09 | End: 2018-06-07 | Stop reason: SDUPTHER

## 2018-03-16 DIAGNOSIS — F41.9 ANXIETY: ICD-10-CM

## 2018-03-20 RX ORDER — ESCITALOPRAM OXALATE 20 MG/1
TABLET ORAL
Qty: 90 TAB | Refills: 0 | Status: SHIPPED | OUTPATIENT
Start: 2018-03-20 | End: 2018-06-07 | Stop reason: SDUPTHER

## 2018-06-07 ENCOUNTER — OFFICE VISIT (OUTPATIENT)
Dept: INTERNAL MEDICINE CLINIC | Facility: CLINIC | Age: 53
End: 2018-06-07

## 2018-06-07 VITALS
SYSTOLIC BLOOD PRESSURE: 119 MMHG | TEMPERATURE: 98.8 F | HEIGHT: 69 IN | RESPIRATION RATE: 18 BRPM | BODY MASS INDEX: 18.66 KG/M2 | HEART RATE: 65 BPM | WEIGHT: 126 LBS | DIASTOLIC BLOOD PRESSURE: 68 MMHG

## 2018-06-07 DIAGNOSIS — D50.9 IRON DEFICIENCY ANEMIA, UNSPECIFIED IRON DEFICIENCY ANEMIA TYPE: ICD-10-CM

## 2018-06-07 DIAGNOSIS — F41.9 ANXIETY: Primary | ICD-10-CM

## 2018-06-07 PROBLEM — M70.52 PATELLAR BURSITIS OF LEFT KNEE: Status: ACTIVE | Noted: 2018-01-10

## 2018-06-07 PROBLEM — M25.552 BILATERAL HIP PAIN: Status: ACTIVE | Noted: 2018-06-07

## 2018-06-07 PROBLEM — M25.551 BILATERAL HIP PAIN: Status: ACTIVE | Noted: 2018-06-07

## 2018-06-07 RX ORDER — ESCITALOPRAM OXALATE 20 MG/1
TABLET ORAL
Qty: 90 TAB | Refills: 0 | Status: SHIPPED | OUTPATIENT
Start: 2018-06-07 | End: 2018-06-20 | Stop reason: SDUPTHER

## 2018-06-07 RX ORDER — CLONAZEPAM 0.5 MG/1
TABLET ORAL
Qty: 30 TAB | Refills: 2 | Status: SHIPPED | OUTPATIENT
Start: 2018-06-07 | End: 2018-09-27 | Stop reason: SDUPTHER

## 2018-06-07 RX ORDER — ALPRAZOLAM 0.5 MG/1
0.5 TABLET ORAL
Qty: 30 TAB | Refills: 0 | Status: SHIPPED | OUTPATIENT
Start: 2018-06-07 | End: 2018-09-27 | Stop reason: SDUPTHER

## 2018-06-07 NOTE — PATIENT INSTRUCTIONS
Iron-Rich Diet: Care Instructions  Your Care Instructions    Your body needs iron to make hemoglobin. Hemoglobin is a substance in red blood cells that carries oxygen from the lungs to cells all through your body. If you do not get enough iron, your body makes fewer and smaller red blood cells. As a result, your body's cells may not get enough oxygen. Adult men need 8 milligrams of iron a day; adult women need 18 milligrams of iron a day. After menopause, women need 8 milligrams of iron a day. A pregnant woman needs 27 milligrams of iron a day. Infants and young children have higher iron needs relative to their size than other age groups. People who have lost blood because of ulcers or heavy menstrual periods may become very low in iron and may develop anemia. Most people can get the iron their bodies need by eating enough of certain iron-rich foods. Your doctor may recommend that you take an iron supplement along with eating an iron-rich diet. Follow-up care is a key part of your treatment and safety. Be sure to make and go to all appointments, and call your doctor if you are having problems. It's also a good idea to know your test results and keep a list of the medicines you take. How can you care for yourself at home? · Make iron-rich foods a part of your daily diet. Iron-rich foods include:  ¨ All meats, such as chicken, beef, lamb, pork, fish, and shellfish. Liver is especially high in iron. ¨ Leafy green vegetables. ¨ Raisins, peas, beans, lentils, barley, and eggs. ¨ Iron-fortified breakfast cereals. · Eat foods with vitamin C along with iron-rich foods. Vitamin C helps you absorb more iron from food. Drink a glass of orange juice or another citrus juice with your food. · Eat meat and vegetables or grains together. The iron in meat helps your body absorb the iron in other foods. Where can you learn more? Go to http://king-guzman.info/.   Enter [id number] in the search box to learn more about \"Iron-Rich Diet: Care Instructions. \"  Current as of: May 12, 2017  Content Version: 11.4  © 6802-7139 [employer], [employer]. Care instructions adapted under license by [employer] (which disclaims liability or warranty for this information). If you have questions about a medical condition or this instruction, always ask your healthcare professional. [address] any warranty or liability for your use of this information.
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
## 2018-06-07 NOTE — PROGRESS NOTES
Subjective:      Xander Lopez is a 47 y.o. female who presents today for anxiety. She requests a PAP but states her menses started today. Mental Health Review  Patient is seen for anxiety disorder. Since last visit: she notes that her symptoms are well controlled on her current regiment. She requests medication refills. Ongoing symptoms include: feeling like she will have a panic attack if she doesn't have her medicine. She denies: SI/SA. Reported side effects from the treatment: none. She is taking the klonopin daily except times where she is having an alcoholic beverage, she uses xanax prn for panic attacks. [facility]. Iron def anemia: she has not had levels checked in a year, she was prescribed iron to take BID but states she always forgets. She will start taking this now and will return this summer for a full CPE. Patient Active Problem List    Diagnosis Date Noted    Bilateral hip pain 06/07/2018    Patellar bursitis of left knee 01/10/2018    Depression 02/19/2010    Anxiety 02/19/2010    Environmental allergies 02/19/2010     Current Outpatient Prescriptions   Medication Sig Dispense Refill    fluticasone propionate (FLONASE ALLERGY RELIEF NA) by Nasal route.  escitalopram oxalate (LEXAPRO) 20 mg tablet TAKE ONE TABLET BY MOUTH ONE TIME DAILY 90 Tab 0    ALPRAZolam (XANAX) 0.5 mg tablet Take 1 Tab by mouth daily as needed for Anxiety. 30 Tab 0    clonazePAM (KLONOPIN) 0.5 mg tablet TAKE ONE TABLET BY MOUTH ONE TIME DAILY FOR 30 DAYS 30 Tab 2    multivitamin (ONE A DAY) tablet Take 1 Tab by mouth daily.  Dapsone (ACZONE) 5 % gel by Apply Externally route.  doxycycline hyclate 100 mg TbEC Take  by mouth as needed.  TAZORAC 0.1 % topical cream daily.  albuterol (PROVENTIL HFA, VENTOLIN HFA) 90 mcg/actuation inhaler Take 2 Puffs by inhalation every six (6) hours as needed for Wheezing for 360 days.  1 Inhaler 2     Past Medical History:   Diagnosis Date    Anxiety 2/19/2010    Depression 2/19/2010    Environmental allergies 2/19/2010        Review of Systems    A comprehensive review of systems was negative except for that written in the HPI. Objective:     Visit Vitals    /68    Pulse 65    Temp 98.8 °F (37.1 °C) (Oral)    Resp 18    Ht 5' 9\" (1.753 m)    Wt 126 lb (57.2 kg)    LMP 06/07/2018    BMI 18.61 kg/m2     General appearance: alert, cooperative, no distress, appears stated age  Head: Normocephalic, without obvious abnormality, atraumatic  Eyes: negative  Lungs: clear to auscultation bilaterally  Heart: regular rate and rhythm, S1, S2 normal, no murmur, click, rub or gallop  Extremities: extremities normal, atraumatic, no cyanosis or edema  Pulses: 2+ and symmetric  Neurologic: Alert and oriented X 3, normal strength and tone. Normal symmetric reflexes. Normal coordination and gait  Psych: appropriate mood, speech, affect  Nursing note and vitals reviewed  Assessment/Plan:       ICD-10-CM ICD-9-CM    1. Anxiety F41.9 300.00 escitalopram oxalate (LEXAPRO) 20 mg tablet      ALPRAZolam (XANAX) 0.5 mg tablet      clonazePAM (KLONOPIN) 0.5 mg tablet   2. Iron deficiency anemia, unspecified iron deficiency anemia type D50.9 280.9      Follow-up Disposition:  Return for Schedule your annual physical and PAP with fasting labs. Advised her to call back or return to office if symptoms worsen/change/persist.  Discussed expected course/resolution/complications of diagnosis in detail with patient. Medication risks/benefits/costs/interactions/alternatives discussed with patient. She was given an after visit summary which includes diagnoses, current medications, & vitals. She expressed understanding with the diagnosis and plan.

## 2018-06-07 NOTE — MR AVS SNAPSHOT
[address] [place] [address] 
[phone] Patient: Huong Kendall MRN: [id number] Visit Information Date & Time Provider Department Dept. Phone Encounter #  
 6/7/2018 11:00 AM Terrance Alba NP [facility] Internal Medicine [phone] [id number] Follow-up Instructions Return for Schedule your annual physical and PAP with fasting labs. Upcoming Health Maintenance Date Due FOBT Q 1 YEAR AGE 50-75 9/11/2016 BREAST CANCER SCRN MAMMOGRAM 8/8/2018 Influenza Age 5 to Adult 8/1/2018 PAP AKA CERVICAL CYTOLOGY 9/27/2019 DTaP/Tdap/Td series (2 - Td) 5/17/2026 Allergies as of 6/7/2018  Review Complete On: 6/7/2018 By: Terrance Alba NP Severity Noted Reaction Type Reactions Sulfa (Sulfonamide Antibiotics)  02/19/2010    Hives Vancomycin  02/19/2010    Rash Current Immunizations  Reviewed on 5/17/2016 Name Date Tdap 5/17/2016 Not reviewed this visit You Were Diagnosed With   
  
 Codes Comments Anxiety    -  Primary ICD-10-CM: F41.9 ICD-9-CM: 300.00 Iron deficiency anemia, unspecified iron deficiency anemia type     ICD-10-CM: D50.9 ICD-9-CM: 280.9 Vitals BP Pulse Temp Resp Height(growth percentile) Weight(growth percentile)  
 119/68 65 98.8 °F (37.1 °C) (Oral) 18 5' 9\" (1.753 m) 126 lb (57.2 kg) LMP BMI OB Status Smoking Status 06/07/2018 18.61 kg/m2 Having regular periods Never Smoker Vitals History BMI and BSA Data Body Mass Index Body Surface Area  
 18.61 kg/m 2 1.67 m 2 Preferred Pharmacy Pharmacy Name Phone [address] [phone] Your Updated Medication List  
  
   
This list is accurate as of 6/7/18 11:37 AM.  Always use your most recent med list. ACZONE 5 % Gel Generic drug:  Dapsone by Apply Externally route. albuterol 90 mcg/actuation inhaler Commonly known as:  PROVENTIL HFA, VENTOLIN HFA, PROAIR HFA Take 2 Puffs by inhalation every six (6) hours as needed for Wheezing for 360 days. ALPRAZolam 0.5 mg tablet Commonly known as:  Comfort  Take 1 Tab by mouth daily as needed for Anxiety. clonazePAM 0.5 mg tablet Commonly known as:  KlonoPIN  
TAKE ONE TABLET BY MOUTH ONE TIME DAILY FOR 30 DAYS  
  
 doxycycline hyclate 100 mg Tbec Take  by mouth as needed. escitalopram oxalate 20 mg tablet Commonly known as:  Minor Muscat TAKE ONE TABLET BY MOUTH ONE TIME DAILY  
  
 FLONASE ALLERGY RELIEF NA  
by Nasal route. multivitamin tablet Commonly known as:  ONE A DAY Take 1 Tab by mouth daily. TAZORAC 0.1 % topical cream  
Generic drug:  tazorotene  
daily. Prescriptions Printed Refills ALPRAZolam (XANAX) 0.5 mg tablet 0 Sig: Take 1 Tab by mouth daily as needed for Anxiety. Class: Print Route: Oral  
 clonazePAM (KLONOPIN) 0.5 mg tablet 2 Sig: TAKE ONE TABLET BY MOUTH ONE TIME DAILY FOR 30 DAYS Class: Print Prescriptions Sent to Pharmacy Refills  
 escitalopram oxalate (LEXAPRO) 20 mg tablet 0 Sig: TAKE ONE TABLET BY MOUTH ONE TIME DAILY Class: Normal  
 Pharmacy: [facility] [id number] [place] [address] [place]  #: [phone] Follow-up Instructions Return for Schedule your annual physical and PAP with fasting labs. Patient Instructions Iron-Rich Diet: Care Instructions Your Care Instructions Your body needs iron to make hemoglobin. Hemoglobin is a substance in red blood cells that carries oxygen from the lungs to cells all through your body. If you do not get enough iron, your body makes fewer and smaller red blood cells. As a result, your body's cells may not get enough oxygen.  
Adult men need 8 milligrams of iron a day; adult women need 18 milligrams of iron a day. After menopause, women need 8 milligrams of iron a day. A pregnant woman needs 27 milligrams of iron a day. Infants and young children have higher iron needs relative to their size than other age groups. People who have lost blood because of ulcers or heavy menstrual periods may become very low in iron and may develop anemia. Most people can get the iron their bodies need by eating enough of certain iron-rich foods. Your doctor may recommend that you take an iron supplement along with eating an iron-rich diet. Follow-up care is a key part of your treatment and safety. Be sure to make and go to all appointments, and call your doctor if you are having problems. It's also a good idea to know your test results and keep a list of the medicines you take. How can you care for yourself at home? · Make iron-rich foods a part of your daily diet. Iron-rich foods include: ¨ All meats, such as chicken, beef, lamb, pork, fish, and shellfish. Liver is especially high in iron. ¨ Leafy green vegetables. ¨ Raisins, peas, beans, lentils, barley, and eggs. ¨ Iron-fortified breakfast cereals. · Eat foods with vitamin C along with iron-rich foods. Vitamin C helps you absorb more iron from food. Drink a glass of orange juice or another citrus juice with your food. · Eat meat and vegetables or grains together. The iron in meat helps your body absorb the iron in other foods. Where can you learn more? Go to http://king-guzman.info/. Enter [id number] in the search box to learn more about \"Iron-Rich Diet: Care Instructions. \" Current as of: May 12, 2017 Content Version: 11.4 © 7236-3122 [employer]. Care instructions adapted under license by [employer] (which disclaims liability or warranty for this information).  If you have questions about a medical condition or this instruction, always ask your healthcare professional. [employer] disclaims any warranty or liability for your use of this information. Introducing [facility]! Willadean Saint introduces [employer] patient portal. Now you can access parts of your medical record, email your doctor's office, and request medication refills online. 1. In your internet browser, go to [email or web address] 2. Click on the First Time User? Click Here link in the Sign In box. You will see the New Member Sign Up page. 3. Enter your [employer] Access Code exactly as it appears below. You will not need to use this code after youve completed the sign-up process. If you do not sign up before the expiration date, you must request a new code. · [employer] Access Code: [email or web address] Expires: 9/5/2018 11:16 AM 
 
4. Enter the last four digits of your Social Security Number (xxxx) and Date of Birth (mm/dd/yyyy) as indicated and click Submit. You will be taken to the next sign-up page. 5. Create a [employer] ID. This will be your [employer] login ID and cannot be changed, so think of one that is secure and easy to remember. 6. Create a [employer] password. You can change your password at any time. 7. Enter your Password Reset Question and Answer. This can be used at a later time if you forget your password. 8. Enter your e-mail address. You will receive e-mail notification when new information is available in [address] 9. Click Sign Up. You can now view and download portions of your medical record. 10. Click the Download Summary menu link to download a portable copy of your medical information. If you have questions, please visit the Frequently Asked Questions section of the [employer] website. Remember, [employer] is NOT to be used for urgent needs. For medical emergencies, dial 911. Now available from your iPhone and Android! Please provide this summary of care documentation to your next provider. Your primary care clinician is listed as Chet Maciel. If you have any questions after today's visit, please call [phone].

## 2018-06-14 ENCOUNTER — TELEPHONE (OUTPATIENT)
Dept: INTERNAL MEDICINE CLINIC | Facility: CLINIC | Age: 53
End: 2018-06-14

## 2018-06-14 NOTE — TELEPHONE ENCOUNTER
Patient called stating that she fell 3 days ago and believes she may have blacked out. States there was significant amount of blood. States later when her daughter looked at the area, daughter informed her that she thought she may need stitches. Patient is now calling as what to do. Advised patient to go ER for further evaluation. Patient hesitant so dispatch health was suggested. Phone number given and advised patient to call and or go to nearest ER.

## 2018-06-27 ENCOUNTER — HOSPITAL ENCOUNTER (OUTPATIENT)
Dept: LAB | Age: 53
Discharge: HOME | End: 2018-06-27

## 2018-06-28 ENCOUNTER — LAB ONLY (OUTPATIENT)
Dept: INTERNAL MEDICINE CLINIC | Facility: CLINIC | Age: 53
End: 2018-06-28

## 2018-06-28 DIAGNOSIS — Z00.00 PHYSICAL EXAM, ANNUAL: ICD-10-CM

## 2018-06-28 DIAGNOSIS — Z00.00 PHYSICAL EXAM, ANNUAL: Primary | ICD-10-CM

## 2018-06-29 LAB
ALBUMIN SERPL-MCNC: 4.4 G/DL (ref 3.5–5.5)
ALBUMIN/GLOB SERPL: 1.7 {RATIO} (ref 1.2–2.2)
ALP SERPL-CCNC: 37 IU/L (ref 39–117)
ALT SERPL-CCNC: 10 IU/L (ref 0–32)
AST SERPL-CCNC: 18 IU/L (ref 0–40)
BASOPHILS # BLD AUTO: 0 X10E3/UL (ref 0–0.2)
BASOPHILS NFR BLD AUTO: 1 %
BILIRUB SERPL-MCNC: 1 MG/DL (ref 0–1.2)
BUN SERPL-MCNC: 15 MG/DL (ref 6–24)
BUN/CREAT SERPL: 19 (ref 9–23)
CALCIUM SERPL-MCNC: 9.2 MG/DL (ref 8.7–10.2)
CHLORIDE SERPL-SCNC: 101 MMOL/L (ref 96–106)
CHOLEST SERPL-MCNC: 191 MG/DL (ref 100–199)
CO2 SERPL-SCNC: 25 MMOL/L (ref 20–29)
CREAT SERPL-MCNC: 0.81 MG/DL (ref 0.57–1)
EOSINOPHIL # BLD AUTO: 0.1 X10E3/UL (ref 0–0.4)
EOSINOPHIL NFR BLD AUTO: 3 %
ERYTHROCYTE [DISTWIDTH] IN BLOOD BY AUTOMATED COUNT: 13.3 % (ref 12.3–15.4)
FERRITIN SERPL-MCNC: 64 NG/ML (ref 15–150)
FOLATE SERPL-MCNC: 19.1 NG/ML
GLOBULIN SER CALC-MCNC: 2.6 G/DL (ref 1.5–4.5)
GLUCOSE SERPL-MCNC: 77 MG/DL (ref 65–99)
HCT VFR BLD AUTO: 40.4 % (ref 34–46.6)
HDLC SERPL-MCNC: 113 MG/DL
HGB BLD-MCNC: 13.5 G/DL (ref 11.1–15.9)
IMM GRANULOCYTES # BLD: 0 X10E3/UL (ref 0–0.1)
IMM GRANULOCYTES NFR BLD: 0 %
IRON SATN MFR SERPL: 83 % (ref 15–55)
IRON SERPL-MCNC: 214 UG/DL (ref 27–159)
LDLC SERPL CALC-MCNC: 68 MG/DL (ref 0–99)
LYMPHOCYTES # BLD AUTO: 1.2 X10E3/UL (ref 0.7–3.1)
LYMPHOCYTES NFR BLD AUTO: 46 %
MCH RBC QN AUTO: 34.5 PG (ref 26.6–33)
MCHC RBC AUTO-ENTMCNC: 33.4 G/DL (ref 31.5–35.7)
MCV RBC AUTO: 103 FL (ref 79–97)
MONOCYTES # BLD AUTO: 0.3 X10E3/UL (ref 0.1–0.9)
MONOCYTES NFR BLD AUTO: 12 %
MORPHOLOGY BLD-IMP: ABNORMAL
NEUTROPHILS # BLD AUTO: 1 X10E3/UL (ref 1.4–7)
NEUTROPHILS NFR BLD AUTO: 38 %
PLATELET # BLD AUTO: 187 X10E3/UL (ref 150–379)
POTASSIUM SERPL-SCNC: 3.9 MMOL/L (ref 3.5–5.2)
PROT SERPL-MCNC: 7 G/DL (ref 6–8.5)
RBC # BLD AUTO: 3.91 X10E6/UL (ref 3.77–5.28)
RETICS/RBC NFR AUTO: 1.1 % (ref 0.6–2.6)
SODIUM SERPL-SCNC: 142 MMOL/L (ref 134–144)
T4 FREE SERPL-MCNC: 1.22 NG/DL (ref 0.82–1.77)
TIBC SERPL-MCNC: 259 UG/DL (ref 250–450)
TRIGL SERPL-MCNC: 52 MG/DL (ref 0–149)
TSH SERPL DL<=0.005 MIU/L-ACNC: 3.97 UIU/ML (ref 0.45–4.5)
UIBC SERPL-MCNC: 45 UG/DL (ref 131–425)
VIT B12 SERPL-MCNC: 276 PG/ML (ref 232–1245)
VLDLC SERPL CALC-MCNC: 10 MG/DL (ref 5–40)
WBC # BLD AUTO: 2.6 X10E3/UL (ref 3.4–10.8)

## 2018-06-29 NOTE — PROGRESS NOTES
Please advise her to make an apt with hematology [facility].  I will put in referral Monday

## 2018-06-29 NOTE — PROGRESS NOTES
Iron supplements discontinued. Follow up recommended with hematology. Does she need urgent evaluation?

## 2018-06-29 NOTE — PROGRESS NOTES
Patient called and advised of iron levels results. Advised as per NP Skiff to stop the iron supplements. Patient agreed.  States she has physical with NP Skiff on Monday and will discuss further

## 2018-07-02 ENCOUNTER — HOSPITAL ENCOUNTER (OUTPATIENT)
Dept: LAB | Age: 53
Discharge: HOME | End: 2018-07-02
Payer: COMMERCIAL

## 2018-07-02 ENCOUNTER — OFFICE VISIT (OUTPATIENT)
Dept: INTERNAL MEDICINE CLINIC | Facility: CLINIC | Age: 53
End: 2018-07-02

## 2018-07-02 VITALS
BODY MASS INDEX: 18.66 KG/M2 | WEIGHT: 126 LBS | DIASTOLIC BLOOD PRESSURE: 83 MMHG | HEIGHT: 69 IN | RESPIRATION RATE: 18 BRPM | TEMPERATURE: 98 F | SYSTOLIC BLOOD PRESSURE: 116 MMHG | HEART RATE: 73 BPM

## 2018-07-02 DIAGNOSIS — Z11.3 SCREEN FOR STD (SEXUALLY TRANSMITTED DISEASE): ICD-10-CM

## 2018-07-02 DIAGNOSIS — Z12.4 CERVICAL CANCER SCREENING: ICD-10-CM

## 2018-07-02 DIAGNOSIS — Z00.00 ENCOUNTER FOR GENERAL ADULT MEDICAL EXAMINATION W/O ABNORMAL FINDINGS: Primary | ICD-10-CM

## 2018-07-02 DIAGNOSIS — Z12.39 BREAST CANCER SCREENING: ICD-10-CM

## 2018-07-02 DIAGNOSIS — D50.9 IRON DEFICIENCY ANEMIA, UNSPECIFIED IRON DEFICIENCY ANEMIA TYPE: ICD-10-CM

## 2018-07-02 DIAGNOSIS — N89.8 VAGINAL DISCHARGE: ICD-10-CM

## 2018-07-02 DIAGNOSIS — Z01.419 ENCOUNTER FOR ROUTINE GYNECOLOGICAL EXAMINATION WITH PAPANICOLAOU SMEAR OF CERVIX: ICD-10-CM

## 2018-07-02 PROCEDURE — 88175 CYTOPATH C/V AUTO FLUID REDO: CPT | Performed by: NURSE PRACTITIONER

## 2018-07-02 NOTE — PROGRESS NOTES
Cholesterol normal. Iron levels high, discontinued iron supplements. Will place hematology referral on Monday when she returns for follow up.  Alk phos stable but low

## 2018-07-02 NOTE — PROGRESS NOTES
Chief Complaint   Patient presents with    Physical     no labs     1. Have you been to the ER, urgent care clinic since your last visit? Hospitalized since your last visit? No    2. Have you seen or consulted any other health care providers outside of the [address] [place] [address] since your last visit? Include any pap smears or colon screening.  No

## 2018-07-02 NOTE — PROGRESS NOTES
Subjective:      Ivett Carrion is a 47 y.o. female who presents today for CPE. Health Maintenance  Immunizations:    Influenza: up to date. Tetanus: up to date. Cancer screening:    Cervical: reviewed guidelines, UTD, done by OBGYN, records requested. Breast: reviewed guidelines, reviewed SBE with her. Colon: referral placed to GI. Mental Health Review  Patient is seen for anxiety disorder. Since last visit: she has a lot of work stressors. She does not want to change her medications at this time. Iron deficiency anemia: iron sat was high, she was taking iron supplements twice daily. She discontinued this on Friday when we received her critical lab values. Lab Results   Component Value Date/Time    WBC 2.6 (L) 06/28/2018 10:00 AM    HGB 13.5 06/28/2018 10:00 AM    HCT 40.4 06/28/2018 10:00 AM    PLATELET 582 98/22/7526 10:00 AM     (H) 06/28/2018 10:00 AM         Patient Care Team:  Fernando Vega NP as PCP - General (Nurse Practitioner)      The following sections were reviewed & updated as appropriate: PMH, PSH, FH, and SH. Patient Active Problem List   Diagnosis Code    Depression F32.9    Anxiety F41.9    Environmental allergies Z91.09    Patellar bursitis of left knee M70.52    Bilateral hip pain M25.551, M25.552          Prior to Admission medications    Medication Sig Start Date End Date Taking? Authorizing Provider   escitalopram oxalate (LEXAPRO) 20 mg tablet TAKE ONE TABLET BY MOUTH ONE TIME DAILY 6/20/18   Lonnie Lyon PA-C   fluticasone propionate [facility] ALLERGY RELIEF NA) by Nasal route. Historical Provider   ALPRAZolam Andrésjeramy Gonzalez) 0.5 mg tablet Take 1 Tab by mouth daily as needed for Anxiety. 6/7/18   Fernando Vgea NP   clonazePAM (KLONOPIN) 0.5 mg tablet TAKE ONE TABLET BY MOUTH ONE TIME DAILY FOR 30 DAYS 6/7/18   Fernando Vega NP   multivitamin (ONE A DAY) tablet Take 1 Tab by mouth daily.     Historical Provider   Dapsone (ACZONE) 5 % gel by Apply Externally route. Historical Provider   doxycycline hyclate 100 mg TbEC Take  by mouth as needed. Historical Provider   TAZORAC 0.1 % topical cream daily. 10/26/15   Historical Provider   albuterol (PROVENTIL HFA, VENTOLIN HFA) 90 mcg/actuation inhaler Take 2 Puffs by inhalation every six (6) hours as needed for Wheezing for 360 days. 1/2/14 12/28/14  Fermin Clements MD        Allergies   Allergen Reactions    Sulfa (Sulfonamide Antibiotics) Hives    Vancomycin Rash      No past surgical history on file. Family History   Problem Relation Age of Onset    Hypertension Father     Cancer Father 71     skin cancer    Cancer Paternal Grandmother      Breast     Social History   Substance Use Topics    Smoking status: Never Smoker    Smokeless tobacco: Never Used    Alcohol use 2.0 oz/week     4 Glasses of wine per week        Review of Systems    A comprehensive review of systems was negative except for that written in the HPI. Objective:     Visit Vitals    /83    Pulse 73    Temp 98 °F (36.7 °C) (Oral)    Resp 18    Ht 5' 9\" (1.753 m)    Wt 126 lb (57.2 kg)    LMP 06/10/2018 (Approximate)    BMI 18.61 kg/m2     General appearance: alert, cooperative, no distress, appears stated age  Head: Normocephalic, without obvious abnormality, atraumatic  Eyes: conjunctivae/corneas clear. PERRL, EOM's intact. Ears: normal TM's and external ear canals AU  Nose: Nares normal. Septum midline. Mucosa normal. No drainage or sinus tenderness. Throat: Lips, mucosa, and tongue normal. Teeth and gums normal  Neck: supple, symmetrical, trachea midline, no adenopathy, thyroid: not enlarged, symmetric, no tenderness/mass/nodules, no carotid bruit and no JVD  Back: symmetric, no curvature. ROM normal. No CVA tenderness.   Lungs: clear to auscultation bilaterally  Breasts: normal appearance, no masses or tenderness, Inspection negative, No nipple retraction or dimpling, No nipple discharge or bleeding, No axillary or supraclavicular adenopathy, Normal to palpation without dominant masses. Breast implants  Heart: regular rate and rhythm, S1, S2 normal, no murmur, click, rub or gallop  Abdomen: soft, non-tender. Bowel sounds normal. No masses,  no organomegaly  Pelvic: External genitalia normal, Vagina normal without discharge, cervix normal in appearance, no CMT  Extremities: extremities normal, atraumatic, no cyanosis or edema  Pulses: 2+ and symmetric  Skin: Skin color, texture, turgor normal. No rashes or lesions  Lymph nodes: Cervical, supraclavicular, and axillary nodes normal.  Neurologic: Alert and oriented X 3, normal strength and tone. Normal symmetric reflexes. Normal coordination and gait      Nursing note and vitals reviewed  Assessment/Plan:       ICD-10-CM ICD-9-CM    1. Encounter for general adult medical examination w/o abnormal findings Z00.00 V70.9    2. Encounter for routine gynecological examination with Papanicolaou smear of cervix Z01.419 V72.31      V76.2    3. Iron deficiency anemia, unspecified iron deficiency anemia type D50.9 280.9 REFERRAL TO HEMATOLOGY   4. Screen for STD (sexually transmitted disease) Z11.3 V74.5    5. Vaginal discharge N89.8 623.5 HIV 1/2 AG/AB, 4TH GENERATION,W RFLX CONFIRM      RPR W/REFLEX TITER AND TREPONEMA ABS      NUSWAB VAGINITIS PLUS   6. Cervical cancer screening Z12.4 V76.2 PAP IG, RFX HPV ASCU, 16&18,45([id number])      REFERRAL TO GASTROENTEROLOGY   7. Breast cancer screening Z12.31 V76.10 [facility] MAMMO BI SCREENING INCL CAD     Follow-up Disposition:  Return for Anxiety. Advised her to call back or return to office if symptoms worsen/change/persist.  Discussed expected course/resolution/complications of diagnosis in detail with patient. Medication risks/benefits/costs/interactions/alternatives discussed with patient. She was given an after visit summary which includes diagnoses, current medications, & vitals.   She expressed understanding with the diagnosis and plan.

## 2018-07-02 NOTE — PATIENT INSTRUCTIONS
PRESCRIPTION REFILL POLICY  Effective 56/99/27    In an effort to ensure that the large volume of prescription refills are processed in the most efficient and expeditious manner, we are asking our patients to assist us by calling your pharmacy for all prescription refills. This will include Mail Order Pharmacies. The pharmacy will contact our office electronically to continue the refill process. Please do not wait until the last minute to call your pharmacy. We require 72 hours (3 days) to fill prescriptions. We also encourage you to call your pharmacy before picking up your prescription to make sure it is ready. With regard to controlled substance refill request (narcotics and many ADD/ADHD treatment prescriptions) and any other prescriptions that need to be picked up at our office, we ask your cooperation by providing us with at least 72 hours (3 days) notice prior to your need of the prescription. You will need to show a valid ID when picking up your prescription. Anyone delegated by you to  your prescriptions must be listed be listed on you HIPPA authorization form, and show a valid ID. Narcotics will not be refilled on a weekend or on a holiday in which the office is closed. We also encourage an alternative method of refill requests, which is through our medically secure web portal, [employer]. This is an efficient and effective way to communicate your requests directly with the office and provider. [employer] can be downloaded onto your smartphone as an Scout. If you are ready to be connected, please review the attached instructions or speak to any of our staff to get you set up right away! Thank you so much for your cooperation. Should you have any questions, please contact our Practice Administrator, Roberta Sanchez [phone]. Well Visit, Women 48 to 72: Care Instructions  Your Care Instructions    Physical exams can help you stay healthy.  Your doctor has checked your overall health and may have suggested ways to take good care of yourself. He or she also may have recommended tests. At home, you can help prevent illness with healthy eating, regular exercise, and other steps. Follow-up care is a key part of your treatment and safety. Be sure to make and go to all appointments, and call your doctor if you are having problems. It's also a good idea to know your test results and keep a list of the medicines you take. How can you care for yourself at home? · Reach and stay at a healthy weight. This will lower your risk for many problems, such as obesity, diabetes, heart disease, and high blood pressure. · Get at least 30 minutes of exercise on most days of the week. Walking is a good choice. You also may want to do other activities, such as running, swimming, cycling, or playing tennis or team sports. · Do not smoke. Smoking can make health problems worse. If you need help quitting, talk to your doctor about stop-smoking programs and medicines. These can increase your chances of quitting for good. · Protect your skin from too much sun. When you're outdoors from 10 a.m. to 4 p.m., stay in the shade or cover up with clothing and a hat with a wide brim. Wear sunglasses that block UV rays. Even when it's cloudy, put broad-spectrum sunscreen (SPF 30 or higher) on any exposed skin. · See a dentist one or two times a year for checkups and to have your teeth cleaned. · Wear a seat belt in the car. · Limit alcohol to 1 drink a day. Too much alcohol can cause health problems. Follow your doctor's advice about when to have certain tests. These tests can spot problems early. · Cholesterol. Your doctor will tell you how often to have this done based on your age, family history, or other things that can increase your risk for heart attack and stroke. · Blood pressure. Have your blood pressure checked during a routine doctor visit.  Your doctor will tell you how often to check your blood pressure based on your age, your blood pressure results, and other factors. · Mammogram. Ask your doctor how often you should have a mammogram, which is an X-ray of your breasts. A mammogram can spot breast cancer before it can be felt and when it is easiest to treat. · Pap test and pelvic exam. Ask your doctor how often you should have a Pap test. You may not need to have a Pap test as often as you used to. · Vision. Have your eyes checked every year or two or as often as your doctor suggests. Some experts recommend that you have yearly exams for glaucoma and other age-related eye problems starting at age 48. · Hearing. Tell your doctor if you notice any change in your hearing. You can have tests to find out how well you hear. · Diabetes. Ask your doctor whether you should have tests for diabetes. · Colon cancer. You should begin tests for colon cancer at age 48. You may have one of several tests. Your doctor will tell you how often to have tests based on your age and risk. Risks include whether you already had a precancerous polyp removed from your colon or whether your parents, sisters and brothers, or children have had colon cancer. · Thyroid disease. Talk to your doctor about whether to have your thyroid checked as part of a regular physical exam. Women have an increased chance of a thyroid problem. · Osteoporosis. You should begin tests for bone density at age 72. If you are younger than 72, ask your doctor whether you have factors that may increase your risk for this disease. You may want to have this test before age 72. · Heart attack and stroke risk. At least every 4 to 6 years, you should have your risk for heart attack and stroke assessed. Your doctor uses factors such as your age, blood pressure, cholesterol, and whether you smoke or have diabetes to show what your risk for a heart attack or stroke is over the next 10 years. When should you call for help?   Watch closely for changes in your health, and be sure to contact your doctor if you have any problems or symptoms that concern you. Where can you learn more? Go to http://king-guzman.info/. Enter B453 in the search box to learn more about \"Well Visit, Women 50 to 72: Care Instructions. \"  Current as of: May 12, 2017  Content Version: 11.4  © 6462-8754 [employer]. Care instructions adapted under license by [employer] (which disclaims liability or warranty for this information). If you have questions about a medical condition or this instruction, always ask your healthcare professional. [place] [address] any warranty or liability for your use of this information.

## 2018-07-05 ENCOUNTER — OFFICE VISIT (OUTPATIENT)
Dept: ONCOLOGY | Age: 53
End: 2018-07-05

## 2018-07-05 VITALS
DIASTOLIC BLOOD PRESSURE: 99 MMHG | RESPIRATION RATE: 14 BRPM | SYSTOLIC BLOOD PRESSURE: 137 MMHG | WEIGHT: 128.2 LBS | HEIGHT: 69 IN | TEMPERATURE: 97.5 F | BODY MASS INDEX: 18.99 KG/M2 | OXYGEN SATURATION: 99 % | HEART RATE: 67 BPM

## 2018-07-05 DIAGNOSIS — R53.83 FATIGUE, UNSPECIFIED TYPE: ICD-10-CM

## 2018-07-05 DIAGNOSIS — D50.8 OTHER IRON DEFICIENCY ANEMIA: Primary | ICD-10-CM

## 2018-07-05 DIAGNOSIS — R79.89 ABNORMAL CBC: ICD-10-CM

## 2018-07-05 NOTE — PROGRESS NOTES
Identified Patient with two Patient identifiers (Name and ). Two Patient Identifiers confirmed. Reviewed record in preparation for visit and have obtained necessary documentation. Chief Complaint   Patient presents with    New Patient     Referral Dr Ja Jones    Anemia       Visit Vitals    Wt 128 lb 3.2 oz (58.2 kg)    BMI 18.93 kg/m2       1. Have you been to the ER, urgent care clinic since your last visit? Hospitalized since your last visit? No    2. Have you seen or consulted any other health care providers outside of the [address] [place] [address] since your last visit? Include any pap smears or colon screening.  No

## 2018-07-05 NOTE — MR AVS SNAPSHOT
[facility] 
 
 
 [address] [place] [address] 
[phone] Patient: Yan Santiago MRN: [id number] ZTH:[birth year] Visit Information Date & Time Provider Department Dept. Phone Encounter #  
 7/5/2018 11:00 AM Silvino Mehta MD [address] [place] [address] Oncology at [facility] [phone] Upcoming Health Maintenance Date Due FOBT Q 1 YEAR AGE 50-75 9/11/2016 BREAST CANCER SCRN MAMMOGRAM 8/8/2018 Influenza Age 5 to Adult 8/1/2018 PAP AKA CERVICAL CYTOLOGY 9/27/2019 DTaP/Tdap/Td series (2 - Td) 5/17/2026 Allergies as of 7/5/2018  Review Complete On: 7/5/2018 By: Uriah Brooks Severity Noted Reaction Type Reactions Sulfa (Sulfonamide Antibiotics)  02/19/2010    Hives Vancomycin  02/19/2010    Rash Current Immunizations  Reviewed on 5/17/2016 Name Date Tdap 5/17/2016 Not reviewed this visit You Were Diagnosed With   
  
 Codes Comments Other iron deficiency anemia    -  Primary ICD-10-CM: D50.8 ICD-9-CM: 280.8 Fatigue, unspecified type     ICD-10-CM: R53.83 ICD-9-CM: 780.79 Abnormal CBC     ICD-10-CM: R79.89 ICD-9-CM: 790.6 Vitals BP Pulse Temp Resp Height(growth percentile) Weight(growth percentile) (!) 137/99 (BP 1 Location: Left arm, BP Patient Position: Sitting) 67 97.5 °F (36.4 °C) (Oral) 14 5' 9\" (1.753 m) 128 lb 3.2 oz (58.2 kg) LMP SpO2 BMI OB Status Smoking Status 06/10/2018 (Approximate) 99% 18.93 kg/m2 Having regular periods Never Smoker Vitals History BMI and BSA Data Body Mass Index Body Surface Area  
 18.93 kg/m 2 1.68 m 2 Preferred Pharmacy Pharmacy Name Phone [facility] [address] [phone] Your Updated Medication List  
  
   
This list is accurate as of 7/5/18 11:44 AM.  Always use your most recent med list. ACZONE 5 % Gel Generic drug:  Dapsone  
by Apply Externally route. albuterol 90 mcg/actuation inhaler Commonly known as:  PROVENTIL HFA, VENTOLIN HFA, PROAIR HFA Take 2 Puffs by inhalation every six (6) hours as needed for Wheezing for 360 days. ALPRAZolam 0.5 mg tablet Commonly known as:  Kathi Tres Take 1 Tab by mouth daily as needed for Anxiety. clonazePAM 0.5 mg tablet Commonly known as:  KlonoPIN  
TAKE ONE TABLET BY MOUTH ONE TIME DAILY FOR 30 DAYS  
  
 doxycycline hyclate 100 mg Tbec Take  by mouth as needed. escitalopram oxalate 20 mg tablet Commonly known as:  Verneta Wai TAKE ONE TABLET BY MOUTH ONE TIME DAILY  
  
 FLONASE ALLERGY RELIEF NA  
by Nasal route. multivitamin tablet Commonly known as:  ONE A DAY Take 1 Tab by mouth daily. TAZORAC 0.1 % topical cream  
Generic drug:  tazorotene  
daily. We Performed the Following HOMOCYSTEINE, PLASMA D0595675 CPT(R)] IRON PROFILE G6767674 CPT(R)]   
 LD [48280 CPT(R)] METHYLMALONIC ACID [15369 CPT(R)] RETICULOCYTE COUNT [id number] CPT(R)] To-Do List   
 10/05/2018 Imaging:  US ABD COMP Referral Information Referral ID Referred By Referred To  
  
 [id number] Rose LEROY Not Available Visits Status Start Date End Date 1 New Request 7/5/18 7/5/19 If your referral has a status of pending review or denied, additional information will be sent to support the outcome of this decision. Introducing [facility]! [facility] introduces [employer] patient portal. Now you can access parts of your medical record, email your doctor's office, and request medication refills online. 1. In your internet browser, go to [email or web address] 2. Click on the First Time User? Click Here link in the Sign In box. You will see the New Member Sign Up page. 3. Enter your [employer] Access Code exactly as it appears below.  You will not need to use this code after youve completed the sign-up process. If you do not sign up before the expiration date, you must request a new code. · [employer] Access Code: [email or web address] Expires: 9/5/2018 11:16 AM 
 
4. Enter the last four digits of your Social Security Number (xxxx) and Date of Birth (mm/dd/yyyy) as indicated and click Submit. You will be taken to the next sign-up page. 5. Create a [employer] ID. This will be your [employer] login ID and cannot be changed, so think of one that is secure and easy to remember. 6. Create a [employer] password. You can change your password at any time. 7. Enter your Password Reset Question and Answer. This can be used at a later time if you forget your password. 8. Enter your e-mail address. You will receive e-mail notification when new information is available in [address] 9. Click Sign Up. You can now view and download portions of your medical record. 10. Click the Download Summary menu link to download a portable copy of your medical information. If you have questions, please visit the Frequently Asked Questions section of the [employer] website. Remember, [employer] is NOT to be used for urgent needs. For medical emergencies, dial 911. Now available from your iPhone and Android! Please provide this summary of care documentation to your next provider. Your primary care clinician is listed as Leela Morris. If you have any questions after today's visit, please call [phone].

## 2018-07-08 LAB
A VAGINAE DNA VAG QL NAA+PROBE: NORMAL SCORE
BVAB2 DNA VAG QL NAA+PROBE: NORMAL SCORE
C ALBICANS DNA VAG QL NAA+PROBE: NEGATIVE
C GLABRATA DNA VAG QL NAA+PROBE: NEGATIVE
C TRACH RRNA SPEC QL NAA+PROBE: NEGATIVE
HCYS SERPL-SCNC: 11.5 UMOL/L (ref 0–15)
HIV 1+2 AB+HIV1 P24 AG SERPL QL IA: NON REACTIVE
IRON SATN MFR SERPL: >94 % (ref 15–55)
IRON SERPL-MCNC: 251 UG/DL (ref 27–159)
LDH SERPL-CCNC: 128 IU/L (ref 119–226)
Lab: NORMAL
MEGA1 DNA VAG QL NAA+PROBE: NORMAL SCORE
METHYLMALONATE SERPL-SCNC: 147 NMOL/L (ref 0–378)
N GONORRHOEA RRNA SPEC QL NAA+PROBE: NEGATIVE
RETICS/RBC NFR AUTO: 1.2 % (ref 0.6–2.6)
RPR SER QL: NON REACTIVE
T VAGINALIS RRNA SPEC QL NAA+PROBE: NEGATIVE
TIBC SERPL-MCNC: <268 UG/DL (ref 250–450)
UIBC SERPL-MCNC: <17 UG/DL (ref 131–425)

## 2018-08-15 ENCOUNTER — HOSPITAL ENCOUNTER (OUTPATIENT)
Dept: ULTRASOUND IMAGING | Age: 53
Discharge: HOME | End: 2018-08-15
Payer: COMMERCIAL

## 2018-08-15 DIAGNOSIS — R79.89 ABNORMAL CBC: ICD-10-CM

## 2018-08-15 DIAGNOSIS — D50.8 OTHER IRON DEFICIENCY ANEMIA: ICD-10-CM

## 2018-08-15 DIAGNOSIS — R53.83 FATIGUE, UNSPECIFIED TYPE: ICD-10-CM

## 2018-08-15 PROCEDURE — 76700 US EXAM ABDOM COMPLETE: CPT

## 2018-09-27 ENCOUNTER — OFFICE VISIT (OUTPATIENT)
Dept: INTERNAL MEDICINE CLINIC | Facility: CLINIC | Age: 53
End: 2018-09-27

## 2018-09-27 VITALS
TEMPERATURE: 98.7 F | HEART RATE: 69 BPM | OXYGEN SATURATION: 100 % | SYSTOLIC BLOOD PRESSURE: 126 MMHG | WEIGHT: 131 LBS | BODY MASS INDEX: 19.4 KG/M2 | RESPIRATION RATE: 17 BRPM | HEIGHT: 69 IN | DIASTOLIC BLOOD PRESSURE: 89 MMHG

## 2018-09-27 DIAGNOSIS — F41.9 ANXIETY: ICD-10-CM

## 2018-09-27 DIAGNOSIS — F41.9 INSOMNIA SECONDARY TO ANXIETY: ICD-10-CM

## 2018-09-27 DIAGNOSIS — Z23 ENCOUNTER FOR IMMUNIZATION: ICD-10-CM

## 2018-09-27 DIAGNOSIS — F51.05 INSOMNIA SECONDARY TO ANXIETY: ICD-10-CM

## 2018-09-27 DIAGNOSIS — F32.A DEPRESSION, UNSPECIFIED DEPRESSION TYPE: Primary | ICD-10-CM

## 2018-09-27 DIAGNOSIS — M70.52 PATELLAR BURSITIS OF LEFT KNEE: ICD-10-CM

## 2018-09-27 RX ORDER — ALPRAZOLAM 0.5 MG/1
0.5 TABLET ORAL
Qty: 30 TAB | Refills: 0 | Status: SHIPPED | OUTPATIENT
Start: 2018-09-27 | End: 2018-12-19 | Stop reason: SDUPTHER

## 2018-09-27 RX ORDER — TRAZODONE HYDROCHLORIDE 50 MG/1
50 TABLET ORAL
Qty: 30 TAB | Refills: 0 | Status: SHIPPED | OUTPATIENT
Start: 2018-09-27 | End: 2018-10-22 | Stop reason: SDUPTHER

## 2018-09-27 RX ORDER — CLONAZEPAM 0.5 MG/1
TABLET ORAL
Qty: 30 TAB | Refills: 2 | Status: SHIPPED | OUTPATIENT
Start: 2018-09-27 | End: 2018-12-19 | Stop reason: SDUPTHER

## 2018-09-27 NOTE — MR AVS SNAPSHOT
Bobyb Torrez 
 
 
 [place] 
[phone] Patient: Gerard Otto MRN: [id number] GOM:9/45/9412 Visit Information Date & Time Provider Department Dept. Phone Encounter #  
 9/27/2018  3:15 PM Darwin Mask, [address] Internal Medicine [phone] [id number] Follow-up Instructions Return in about 4 weeks (around 10/25/2018) for Anxiety. CALL for any worsening symptoms. Upcoming Health Maintenance Date Due Shingrix Vaccine Age 50> (1 of 2) 9/11/2016 FOBT Q 1 YEAR AGE 50-75 9/11/2016 Influenza Age 5 to Adult 8/1/2018 BREAST CANCER SCRN MAMMOGRAM 8/8/2018 PAP AKA CERVICAL CYTOLOGY 7/2/2021 DTaP/Tdap/Td series (2 - Td) 5/17/2026 Allergies as of 9/27/2018  Review Complete On: 9/27/2018 By: Darwin Mask, NP Severity Noted Reaction Type Reactions Sulfa (Sulfonamide Antibiotics)  02/19/2010    Hives Vancomycin  02/19/2010    Rash Current Immunizations  Reviewed on 5/17/2016 Name Date Influenza Vaccine (Quad) PF  Incomplete Tdap 5/17/2016 Not reviewed this visit You Were Diagnosed With   
  
 Codes Comments Depression, unspecified depression type    -  Primary ICD-10-CM: F32.9 ICD-9-CM: 848 Anxiety     ICD-10-CM: F41.9 ICD-9-CM: 300.00 Insomnia secondary to anxiety     ICD-10-CM: F41.9, F51.05 
ICD-9-CM: 300.00, 327.02 Encounter for immunization     ICD-10-CM: A91 ICD-9-CM: V03.89 Vitals BP Pulse Temp Resp Height(growth percentile) Weight(growth percentile) 126/89 (BP 1 Location: Right arm, BP Patient Position: Sitting) 69 98.7 °F (37.1 °C) (Oral) 17 5' 9\" (1.753 m) 131 lb (59.4 kg) SpO2 BMI OB Status Smoking Status 100% 19.35 kg/m2 Perimenopausal Never Smoker Vitals History BMI and BSA Data Body Mass Index Body Surface Area  
 19.35 kg/m 2 1.7 m 2 Preferred Pharmacy Pharmacy Name Phone [facility] [address] [phone] Your Updated Medication List  
  
   
This list is accurate as of 9/27/18  4:50 PM.  Always use your most recent med list. ACZONE 5 % Gel Generic drug:  Dapsone  
by Apply Externally route. albuterol 90 mcg/actuation inhaler Commonly known as:  PROVENTIL HFA, VENTOLIN HFA, PROAIR HFA Take 2 Puffs by inhalation every six (6) hours as needed for Wheezing for 360 days. ALPRAZolam 0.5 mg tablet Commonly known as:  [place] Darya Take 1 Tab by mouth daily as needed for Anxiety. clonazePAM 0.5 mg tablet Commonly known as:  KlonoPIN  
TAKE ONE TABLET BY MOUTH ONE TIME DAILY FOR 30 DAYS  
  
 doxycycline hyclate 100 mg Tbec Take  by mouth as needed. escitalopram oxalate 20 mg tablet Commonly known as:  Garth Gage TAKE ONE TABLET BY MOUTH ONE TIME DAILY  
  
 FLONASE ALLERGY RELIEF NA  
by Nasal route. multivitamin tablet Commonly known as:  ONE A DAY Take 1 Tab by mouth daily. TAZORAC 0.1 % topical cream  
Generic drug:  tazorotene  
daily. traZODone 50 mg tablet Commonly known as:  Merritt Wong Take 1 Tab by mouth nightly. Prescriptions Printed Refills ALPRAZolam (XANAX) 0.5 mg tablet 0 Sig: Take 1 Tab by mouth daily as needed for Anxiety. Class: Print Route: Oral  
 clonazePAM (KLONOPIN) 0.5 mg tablet 2 Sig: TAKE ONE TABLET BY MOUTH ONE TIME DAILY FOR 30 DAYS Class: Print Prescriptions Sent to Pharmacy Refills  
 traZODone (DESYREL) 50 mg tablet 0 Sig: Take 1 Tab by mouth nightly. Class: Normal  
 Pharmacy: [facility] [id number] [place] [address] [place] Ph #: [phone] Route: Oral  
  
We Performed the Following INFLUENZA VIRUS VAC QUAD,SPLIT,PRESV FREE SYRINGE IM [id number] CPT(R)] REFERRAL TO BEHAVIORAL HEALTH [REF8 Custom] REFERRAL TO PSYCHIATRY [REF91 Custom] Follow-up Instructions Return in about 4 weeks (around 10/25/2018) for Anxiety. CALL for any worsening symptoms. Referral Information Referral ID Referred By Referred To  
  
 [id number] SKIFF, Marty Antes, NP   
   [address] [place], [address] [facility] Phone: [phone] Fax: [phone] Visits Status Start Date End Date 1 New Request 9/27/18 9/27/19 If your referral has a status of pending review or denied, additional information will be sent to support the outcome of this decision. Referral ID Referred By Referred To  
 [id number] SKIFF, Jolyn Ancona, [address] [facility] [address] Phone: [phone] Fax: [phone] Visits Status Start Date End Date 1 New Request 9/27/18 9/27/19 If your referral has a status of pending review or denied, additional information will be sent to support the outcome of this decision. Patient Instructions Recovering From Depression: Care Instructions Your Care Instructions Taking good care of yourself is important as you recover from depression. In time, your symptoms will fade as your treatment takes hold. Do not give up. Instead, focus your energy on getting better. Your mood will improve. It just takes some time. Focus on things that can help you feel better, such as being with friends and family, eating well, and getting enough rest. But take things slowly. Do not do too much too soon. You will begin to feel better gradually. Follow-up care is a key part of your treatment and safety. Be sure to make and go to all appointments, and call your doctor if you are having problems. It's also a good idea to know your test results and keep a list of the medicines you take. How can you care for yourself at home? Be realistic · If you have a large task to do, break it up into smaller steps you can handle, and just do what you can. · You may want to put off important decisions until your depression has lifted. If you have plans that will have a major impact on your life, such as marriage, divorce, or a job change, try to wait a bit. Talk it over with friends and loved ones who can help you look at the overall picture first. 
· Reaching out to people for help is important. Do not isolate yourself. Let your family and friends help you. Find someone you can trust and confide in, and talk to that person. · Be patient, and be kind to yourself. Remember that depression is not your fault and is not something you can overcome with willpower alone. Treatment is necessary for depression, just like for any other illness. Feeling better takes time, and your mood will improve little by little. Stay active · Stay busy and get outside. Take a walk, or try some other light exercise. · Talk with your doctor about an exercise program. Exercise can help with mild depression. · Go to a movie or concert. Take part in a Rastafarian activity or other social gathering. Go to a ball game. · Ask a friend to have dinner with you. Take care of yourself · Eat a balanced diet with plenty of fresh fruits and vegetables, whole grains, and lean protein. If you have lost your appetite, eat small snacks rather than large meals. · Avoid drinking alcohol or using illegal drugs. Do not take medicines that have not been prescribed for you. They may interfere with medicines you may be taking for depression, or they may make your depression worse. · Take your medicines exactly as they are prescribed. You may start to feel better within 1 to 3 weeks of taking antidepressant medicine. But it can take as many as 6 to 8 weeks to see more improvement. If you have questions or concerns about your medicines, or if you do not notice any improvement by 3 weeks, talk to your doctor. · If you have any side effects from your medicine, tell your doctor. Antidepressants can make you feel tired, dizzy, or nervous. Some people have dry mouth, constipation, headaches, sexual problems, or diarrhea. Many of these side effects are mild and will go away on their own after you have been taking the medicine for a few weeks. Some may last longer. Talk to your doctor if side effects are bothering you too much. You might be able to try a different medicine. · Get enough sleep. If you have problems sleeping: ¨ Go to bed at the same time every night, and get up at the same time every morning. ¨ Keep your bedroom dark and quiet. ¨ Do not exercise after 5:00 p.m. ¨ Avoid drinks with caffeine after 5:00 p.m. · Avoid sleeping pills unless they are prescribed by the doctor treating your depression. Sleeping pills may make you groggy during the day, and they may interact with other medicine you are taking. · If you have any other illnesses, such as diabetes, heart disease, or high blood pressure, make sure to continue with your treatment. Tell your doctor about all of the medicines you take, including those with or without a prescription. · Keep the numbers for these national suicide hotlines: 7-824-812-TALK (8-550.167.7149) and 3-687-GOJDGZF (2-197.160.4733). If you or someone you know talks about suicide or feeling hopeless, get help right away. When should you call for help? Call 911 anytime you think you may need emergency care. For example, call if: 
  · You feel like hurting yourself or someone else.  
  · Someone you know has depression and is about to attempt or is attempting suicide.  
[facility] your doctor now or seek immediate medical care if: 
  · You hear voices.  
  · Someone you know has depression and: 
¨ Starts to give away his or her possessions. ¨ Uses illegal drugs or drinks alcohol heavily. ¨ Talks or writes about death, including writing suicide notes or talking about guns, knives, or pills. ¨ Starts to spend a lot of time alone. ¨ Acts very aggressively or suddenly appears calm.  
 Watch closely for changes in your health, and be sure to contact your doctor if: 
  · You do not get better as expected. Where can you learn more? Go to http://king-guzman.info/. Enter U034 in the search box to learn more about \"Recovering From Depression: Care Instructions. \" Current as of: December 7, 2017 Content Version: 11.7 © 0827-8470 "[employer]. Care instructions adapted under license by [employer] (which disclaims liability or warranty for this information). If you have questions about a medical condition or this instruction, always ask your healthcare professional. [address] any warranty or liability for your use of this information. Suicidal Thoughts and Behavior: Care Instructions Your Care Instructions You have been seen by a doctor because you've had thoughts about killing yourself. Maybe you have tried to do it. This is much different from having fleeting thoughts of death, which many people have from time to time. Your doctor and support team will work hard to help keep you safe. Your team may include a , a , and a counselor. Most people who think about suicide don't want to die. They think suicide will end their problems and pain. People who consider suicide often feel hopeless, helpless, and worthless. These thoughts can make a person feel that there is no other choice. But you do have a choice. Help is always available. The doctor and staff members are taking you and your pain very seriously. It is important to remember that there are people who are willing and able to talk with you about your suicidal thoughts. Treatment and close follow-up care can help you feel better about life. Thoughts of hopelessness and suicide may come from being depressed. Depression is a medical condition.  When you have depression, there may be problems with activity levels in certain parts of your brain. Chemicals in your brain called neurotransmitters may be out of balance. But depression can be treated. Treatment for depression includes counseling, medicines, and lifestyle changes. With treatment, you can feel better. Your doctor doesn't want you to hurt yourself. He or she may ask you to sign a \"no harm\" agreement or contract. This contract is your promise that you will not hurt yourself between now and your next visit. Be completely honest with your doctor if you feel that you can't sign it. You will get help. Follow-up care is a key part of your treatment and safety. Be sure to make and go to all appointments, and call your doctor if you are having problems. It's also a good idea to know your test results and keep a list of the medicines you take. How can you care for yourself at home? · Talk to someone. Reach out to family members, friends, your doctor, or a counselor. Be open and honest with them about your thoughts and feelings. · Be safe with medicines. Take your medicines exactly as prescribed. Call your doctor if you think you are having a problem with your medicine. · Avoid illegal drugs and alcohol. · Attend all counseling sessions recommended by your doctor. · Have someone take away sharp or dangerous objects, guns, and drugs from your home. · Keep the numbers for these national suicide hotlines: 9-357-975-TALK (2-517.119.6644) and 0-963-LAGMSXX (2-441.787.1937). When should you call for help? Call 911 anytime you think you may need emergency care. For example, call if: 
  · You feel you cannot stop from hurting yourself or someone else.  
[facility] your doctor now or seek immediate medical care if: 
  · You have one or more warning signs of suicide. For example, call if: 
¨ You feel like giving away your possessions. ¨ You use illegal drugs or drink alcohol heavily. ¨ You talk or write about death. This may include writing suicide notes and talking about guns, knives, or pills. ¨ You start to spend a lot of time alone or spend more time alone than usual.  
  · You hear voices.  
  · You start acting in an aggressive way that's not normal for you.  
 Watch closely for changes in your health, and be sure to contact your doctor if you have any problems. Where can you learn more? Go to http://king-guzman.info/. Enter S828 in the search box to learn more about \"Suicidal Thoughts and Behavior: Care Instructions. \" Current as of: December 7, 2017 Content Version: 11.7 © 0762-1051 [employer]). Care instructions adapted under license by [employer] (which disclaims liability or warranty for this information). If you have questions about a medical condition or this instruction, always ask your healthcare professional. [address] any warranty or liability for your use of this information. Introducing [facility]! Yonas Davis introduces Perfect Audience patient portal. Now you can access parts of your medical record, email your doctor's office, and request medication refills online. 1. In your internet browser, go to [email or web address] 2. Click on the First Time User? Click Here link in the Sign In box. You will see the New Member Sign Up page. 3. Enter your Perfect Audience Access Code exactly as it appears below. You will not need to use this code after youve completed the sign-up process. If you do not sign up before the expiration date, you must request a new code. · Perfect Audience Access Code: [email or web address] Expires: 12/26/2018  4:49 PM 
 
4. Enter the last four digits of your Social Security Number (xxxx) and Date of Birth (mm/dd/yyyy) as indicated and click Submit. You will be taken to the next sign-up page. 5. Create a Perfect Audience ID.  This will be your Perfect Audience login ID and cannot be changed, so think of one that is secure and easy to remember. 6. Create a DeNovaMed password. You can change your password at any time. 7. Enter your Password Reset Question and Answer. This can be used at a later time if you forget your password. 8. Enter your e-mail address. You will receive e-mail notification when new information is available in [address] 9. Click Sign Up. You can now view and download portions of your medical record. 10. Click the Download Summary menu link to download a portable copy of your medical information. If you have questions, please visit the Frequently Asked Questions section of the DeNovaMed website. Remember, DeNovaMed is NOT to be used for urgent needs. For medical emergencies, dial 911. Now available from your iPhone and Android! Please provide this summary of care documentation to your next provider. Your primary care clinician is listed as Shadi Foreman. If you have any questions after today's visit, please call [phone].

## 2018-09-27 NOTE — PROGRESS NOTES
Subjective:      Bret Torres is a 47 y.o. female who presents today for follow up. Mental Health Review  Patient is seen for anxiety disorder. Since last visit: she is having more anxiety lately, her mother has been sick with alzheimers in [place], she is losing her position at work. She notes her drinking has increased to help her sleep. Ongoing symptoms include: insomnia, racing thoughts, feelings of losing control, suicidal thoughts. She denies: SA.  Reported side effects from the treatment: none. Discussed adding in a medication for sleep, patient agrees. She states her  is not very supportive. Knee pain: she had bursitis surgery on her left knee and states it was well healed, lately she notes swelling and a lump sensation. She will contact her orthopedic surgeon. Anemia: she has seen Dr. Unique Brooks for iron def anemia. She had an US which was normal, discussed results with patient. She is waiting to hear back from them about blood work she had done. Patient Active Problem List    Diagnosis Date Noted    Iron deficiency anemia 07/02/2018    Bilateral hip pain 06/07/2018    Patellar bursitis of left knee 01/10/2018    Depression 02/19/2010    Anxiety 02/19/2010    Environmental allergies 02/19/2010     Current Outpatient Prescriptions   Medication Sig Dispense Refill    escitalopram oxalate (LEXAPRO) 20 mg tablet TAKE ONE TABLET BY MOUTH ONE TIME DAILY 90 Tab 1    fluticasone propionate (FLONASE ALLERGY RELIEF NA) by Nasal route.  ALPRAZolam (XANAX) 0.5 mg tablet Take 1 Tab by mouth daily as needed for Anxiety. 30 Tab 0    clonazePAM (KLONOPIN) 0.5 mg tablet TAKE ONE TABLET BY MOUTH ONE TIME DAILY FOR 30 DAYS 30 Tab 2    multivitamin (ONE A DAY) tablet Take 1 Tab by mouth daily.  Dapsone (ACZONE) 5 % gel by Apply Externally route.  doxycycline hyclate 100 mg TbEC Take  by mouth as needed.  TAZORAC 0.1 % topical cream daily.       albuterol (PROVENTIL HFA, VENTOLIN HFA) 90 mcg/actuation inhaler Take 2 Puffs by inhalation every six (6) hours as needed for Wheezing for 360 days. 1 Inhaler 2     Allergies   Allergen Reactions    Sulfa (Sulfonamide Antibiotics) Hives    Vancomycin Rash     Past Medical History:   Diagnosis Date    Anxiety 2/19/2010    Depression 2/19/2010    Environmental allergies 2/19/2010     History reviewed. No pertinent surgical history. Review of Systems    A comprehensive review of systems was negative except for that written in the HPI. Objective:     Visit Vitals    /89 (BP 1 Location: Right arm, BP Patient Position: Sitting)    Pulse 69    Temp 98.7 °F (37.1 °C) (Oral)    Resp 17    Ht 5' 9\" (1.753 m)    Wt 131 lb (59.4 kg)    SpO2 100%    BMI 19.35 kg/m2     General appearance: alert, cooperative, no distress, appears stated age  Head: Normocephalic, without obvious abnormality, atraumatic  Eyes: negative  Back: symmetric, no curvature. ROM normal. No CVA tenderness. Lungs: clear to auscultation bilaterally  Heart: regular rate and rhythm, S1, S2 normal, no murmur, click, rub or gallop  Extremities: extremities normal, atraumatic, no cyanosis or edema  Pulses: 2+ and symmetric  Skin: Skin color, texture, turgor normal. No rashes or lesions  Neurologic: Grossly normal  Psych: tearful, rational thought and appropriate responses to questions  Nursing note and vitals reviewed  Assessment/Plan:       ICD-10-CM ICD-9-CM    1. Depression, unspecified depression type F32.9 311 REFERRAL TO PSYCHIATRY      REFERRAL TO BEHAVIORAL HEALTH   2. Anxiety F41.9 300.00 ALPRAZolam (XANAX) 0.5 mg tablet      clonazePAM (KLONOPIN) 0.5 mg tablet      REFERRAL TO PSYCHIATRY      REFERRAL TO BEHAVIORAL HEALTH   3. Insomnia secondary to anxiety F41.9 300.00 traZODone (DESYREL) 50 mg tablet    F51.05 327.02    4. Encounter for immunization Z23 V03.89 INFLUENZA VIRUS VAC QUAD,SPLIT,PRESV FREE SYRINGE IM   5.  Patellar bursitis of left knee M70.52 726.60    She will make apts with psych and behavioral health. She works out of town and will look there if she cannot get an appointment within a reasonable time frame. Follow-up Disposition:  Return in about 4 weeks (around 10/25/2018) for Anxiety. CALL for any worsening symptoms. Advised her to call back or return to office if symptoms worsen/change/persist.  Discussed expected course/resolution/complications of diagnosis in detail with patient. Medication risks/benefits/costs/interactions/alternatives discussed with patient. She was given an after visit summary which includes diagnoses, current medications, & vitals. She expressed understanding with the diagnosis and plan.

## 2018-09-27 NOTE — PATIENT INSTRUCTIONS
Recovering From Depression: Care Instructions  Your Care Instructions    Taking good care of yourself is important as you recover from depression. In time, your symptoms will fade as your treatment takes hold. Do not give up. Instead, focus your energy on getting better. Your mood will improve. It just takes some time. Focus on things that can help you feel better, such as being with friends and family, eating well, and getting enough rest. But take things slowly. Do not do too much too soon. You will begin to feel better gradually. Follow-up care is a key part of your treatment and safety. Be sure to make and go to all appointments, and call your doctor if you are having problems. It's also a good idea to know your test results and keep a list of the medicines you take. How can you care for yourself at home? Be realistic  · If you have a large task to do, break it up into smaller steps you can handle, and just do what you can. · You may want to put off important decisions until your depression has lifted. If you have plans that will have a major impact on your life, such as marriage, divorce, or a job change, try to wait a bit. Talk it over with friends and loved ones who can help you look at the overall picture first.  · Reaching out to people for help is important. Do not isolate yourself. Let your family and friends help you. Find someone you can trust and confide in, and talk to that person. · Be patient, and be kind to yourself. Remember that depression is not your fault and is not something you can overcome with willpower alone. Treatment is necessary for depression, just like for any other illness. Feeling better takes time, and your mood will improve little by little. Stay active  · Stay busy and get outside. Take a walk, or try some other light exercise. · Talk with your doctor about an exercise program. Exercise can help with mild depression. · Go to a movie or concert.  Take part in a [employer]regational activity or other social gathering. Go to a [employer] game. · Ask a friend to have dinner with you. Take care of yourself  · Eat a balanced diet with plenty of fresh fruits and vegetables, whole grains, and lean protein. If you have lost your appetite, eat small snacks rather than large meals. · Avoid drinking alcohol or using illegal drugs. Do not take medicines that have not been prescribed for you. They may interfere with medicines you may be taking for depression, or they may make your depression worse. · Take your medicines exactly as they are prescribed. You may start to feel better within 1 to 3 weeks of taking antidepressant medicine. But it can take as many as 6 to 8 weeks to see more improvement. If you have questions or concerns about your medicines, or if you do not notice any improvement by 3 weeks, talk to your doctor. · If you have any side effects from your medicine, tell your doctor. Antidepressants can make you feel tired, dizzy, or nervous. Some people have dry mouth, constipation, headaches, sexual problems, or diarrhea. Many of these side effects are mild and will go away on their own after you have been taking the medicine for a few weeks. Some may last longer. Talk to your doctor if side effects are bothering you too much. You might be able to try a different medicine. · Get enough sleep. If you have problems sleeping:  ¨ Go to bed at the same time every night, and get up at the same time every morning. ¨ Keep your bedroom dark and quiet. ¨ Do not exercise after 5:00 p.m. ¨ Avoid drinks with caffeine after 5:00 p.m. · Avoid sleeping pills unless they are prescribed by the doctor treating your depression. Sleeping pills may make you groggy during the day, and they may interact with other medicine you are taking. · If you have any other illnesses, such as diabetes, heart disease, or high blood pressure, make sure to continue with your treatment.  Tell your doctor about all of the medicines you take, including those with or without a prescription. · Keep the numbers for these national suicide hotlines: 0-990-892-TALK (0-470.616.5986) and 6-200-OFKZRIZ (6-993.884.9324). If you or someone you know talks about suicide or feeling hopeless, get help right away. When should you call for help? Call 911 anytime you think you may need emergency care. For example, call if:    · You feel like hurting yourself or someone else.     · Someone you know has depression and is about to attempt or is attempting suicide.   [facility] your doctor now or seek immediate medical care if:    · You hear voices.     · Someone you know has depression and:  ¨ Starts to give away his or her possessions. ¨ Uses illegal drugs or drinks alcohol heavily. ¨ Talks or writes about death, including writing suicide notes or talking about guns, knives, or pills. ¨ Starts to spend a lot of time alone. ¨ Acts very aggressively or suddenly appears calm.    Watch closely for changes in your health, and be sure to contact your doctor if:    · You do not get better as expected. Where can you learn more? Go to http://king-guzman.info/. Enter L340 in the search box to learn more about \"Recovering From Depression: Care Instructions. \"  Current as of: December 7, 2017  Content Version: 11.7  © 5120-3447 [employer]. Care instructions adapted under license by [employer] (which disclaims liability or warranty for this information). If you have questions about a medical condition or this instruction, always ask your healthcare professional. [place] [address] any warranty or liability for your use of this information. Suicidal Thoughts and Behavior: Care Instructions  Your Care Instructions  You have been seen by a doctor because you've had thoughts about killing yourself. Maybe you have tried to do it.  This is much different from having fleeting thoughts of death, which many people have from time to time. Your doctor and support team will work hard to help keep you safe. Your team may include a , a , and a counselor. Most people who think about suicide don't want to die. They think suicide will end their problems and pain. People who consider suicide often feel hopeless, helpless, and worthless. These thoughts can make a person feel that there is no other choice. But you do have a choice. Help is always available. The doctor and staff members are taking you and your pain very seriously. It is important to remember that there are people who are willing and able to talk with you about your suicidal thoughts. Treatment and close follow-up care can help you feel better about life. Thoughts of hopelessness and suicide may come from being depressed. Depression is a medical condition. When you have depression, there may be problems with activity levels in certain parts of your brain. Chemicals in your brain called neurotransmitters may be out of balance. But depression can be treated. Treatment for depression includes counseling, medicines, and lifestyle changes. With treatment, you can feel better. Your doctor doesn't want you to hurt yourself. He or she may ask you to sign a \"no harm\" agreement or contract. This contract is your promise that you will not hurt yourself between now and your next visit. Be completely honest with your doctor if you feel that you can't sign it. You will get help. Follow-up care is a key part of your treatment and safety. Be sure to make and go to all appointments, and call your doctor if you are having problems. It's also a good idea to know your test results and keep a list of the medicines you take. How can you care for yourself at home? · Talk to someone. Reach out to family members, friends, your doctor, or a counselor. Be open and honest with them about your thoughts and feelings. · Be safe with medicines.  Take your medicines exactly as prescribed. Call your doctor if you think you are having a problem with your medicine. · Avoid illegal drugs and alcohol. · Attend all counseling sessions recommended by your doctor. · Have someone take away sharp or dangerous objects, guns, and drugs from your home. · Keep the numbers for these national suicide hotlines: 8-976-008-TALK (1-477.844.3768) and 2-259-MAVPEBQ (6-832.899.9051). When should you call for help? Call 911 anytime you think you may need emergency care. For example, call if:    · You feel you cannot stop from hurting yourself or someone else.   [facility] your doctor now or seek immediate medical care if:    · You have one or more warning signs of suicide. For example, call if:  ¨ You feel like giving away your possessions. ¨ You use illegal drugs or drink alcohol heavily. ¨ You talk or write about death. This may include writing suicide notes and talking about guns, knives, or pills. ¨ You start to spend a lot of time alone or spend more time alone than usual.     · You hear voices.     · You start acting in an aggressive way that's not normal for you.    Watch closely for changes in your health, and be sure to contact your doctor if you have any problems. Where can you learn more? Go to http://king-guzman.info/. Enter K950 in the search box to learn more about \"Suicidal Thoughts and Behavior: Care Instructions. \"  Current as of: December 7, 2017  Content Version: 11.7  © 4039-7200 [employer]. Care instructions adapted under license by [employer] (which disclaims liability or warranty for this information). If you have questions about a medical condition or this instruction, always ask your healthcare professional. [address] any warranty or liability for your use of this information.

## 2018-09-27 NOTE — PROGRESS NOTES
Room 4    Chief Complaint   Patient presents with    Medication Evaluation     1. Have you been to the ER, urgent care clinic since your last visit? Hospitalized since your last visit? No    2. Have you seen or consulted any other health care providers outside of the [facility] since your last visit? Include any pap smears or colon screening.  No

## 2018-12-19 ENCOUNTER — OFFICE VISIT (OUTPATIENT)
Dept: INTERNAL MEDICINE CLINIC | Facility: CLINIC | Age: 53
End: 2018-12-19

## 2018-12-19 VITALS
SYSTOLIC BLOOD PRESSURE: 118 MMHG | WEIGHT: 124 LBS | HEART RATE: 87 BPM | BODY MASS INDEX: 18.37 KG/M2 | DIASTOLIC BLOOD PRESSURE: 84 MMHG | HEIGHT: 69 IN | TEMPERATURE: 98.9 F | RESPIRATION RATE: 18 BRPM | OXYGEN SATURATION: 100 %

## 2018-12-19 DIAGNOSIS — F32.2 CURRENT SEVERE EPISODE OF MAJOR DEPRESSIVE DISORDER WITHOUT PSYCHOTIC FEATURES WITHOUT PRIOR EPISODE (HCC): ICD-10-CM

## 2018-12-19 DIAGNOSIS — Z01.818 PREOPERATIVE CLEARANCE: Primary | ICD-10-CM

## 2018-12-19 DIAGNOSIS — D70.9 NEUTROPENIA, UNSPECIFIED TYPE (HCC): ICD-10-CM

## 2018-12-19 DIAGNOSIS — F41.9 ANXIETY: ICD-10-CM

## 2018-12-19 RX ORDER — ESCITALOPRAM OXALATE 20 MG/1
TABLET ORAL
Qty: 90 TAB | Refills: 3 | Status: SHIPPED | OUTPATIENT
Start: 2018-12-19

## 2018-12-19 RX ORDER — CIPROFLOXACIN 500 MG/1
TABLET ORAL 2 TIMES DAILY
COMMUNITY

## 2018-12-19 RX ORDER — CLONAZEPAM 0.5 MG/1
TABLET ORAL
Qty: 30 TAB | Refills: 2 | Status: SHIPPED | OUTPATIENT
Start: 2018-12-19 | End: 2019-04-10 | Stop reason: SDUPTHER

## 2018-12-19 RX ORDER — ALPRAZOLAM 0.5 MG/1
0.5 TABLET ORAL
Qty: 30 TAB | Refills: 0 | Status: SHIPPED | OUTPATIENT
Start: 2018-12-19 | End: 2019-02-18 | Stop reason: SDUPTHER

## 2018-12-19 NOTE — PROGRESS NOTES
Room 4    Chief Complaint   Patient presents with    Pre-op Exam     Surgery on eyelids on 12/27/2018    Medication Evaluation     Refill in Lexapro, klonopin and Xanax     1. Have you been to the ER, urgent care clinic since your last visit? Hospitalized since your last visit? Yes Reason for visit: To [facility] for UTI on Saturday    2. Have you seen or consulted any other health care providers outside of the [address] [place] [address] since your last visit? Include any pap smears or colon screening.  [facility]

## 2018-12-19 NOTE — PROGRESS NOTES
Subjective:      Gabriela Portillo is a 47 y.o. female who presents today for preop. Mental Health Review  Patient is seen for anxiety disorder. Since last visit: she had a DUI, she had to quit her job to help take care of her ailing mother so is unemployed, and she is working on getting divorces . She denies: homocidal ideation but states she is having suicidal ideation. Reported side effects from the treatment: none. She requests refills of meds today. Preop: she is having eyelid surgery on 12/27/18. She has had 2 previous surgeries (knee and breast implants) that went well. She denies latex allergies. She denies chest pain, SOB, cough, nausea, vomiting, diarrhea. Body mass index is 18.31 kg/m². Wt Readings from Last 3 Encounters:   12/19/18 124 lb (56.2 kg)   09/27/18 131 lb (59.4 kg)   07/05/18 128 lb 3.2 oz (58.2 kg)           Patient Active Problem List    Diagnosis Date Noted    Iron deficiency anemia 07/02/2018    Bilateral hip pain 06/07/2018    Patellar bursitis of left knee 01/10/2018    Depression 02/19/2010    Anxiety 02/19/2010    Environmental allergies 02/19/2010     Current Outpatient Medications   Medication Sig Dispense Refill    ciprofloxacin HCl (CIPRO) 500 mg tablet Take  by mouth two (2) times a day.  traZODone (DESYREL) 50 mg tablet Take 1 Tab by mouth nightly. 30 Tab 5    ALPRAZolam (XANAX) 0.5 mg tablet Take 1 Tab by mouth daily as needed for Anxiety. 30 Tab 0    clonazePAM (KLONOPIN) 0.5 mg tablet TAKE ONE TABLET BY MOUTH ONE TIME DAILY FOR 30 DAYS 30 Tab 2    escitalopram oxalate (LEXAPRO) 20 mg tablet TAKE ONE TABLET BY MOUTH ONE TIME DAILY 90 Tab 1    fluticasone propionate (FLONASE ALLERGY RELIEF NA) by Nasal route.  multivitamin (ONE A DAY) tablet Take 1 Tab by mouth daily.  Dapsone (ACZONE) 5 % gel by Apply Externally route.  doxycycline hyclate 100 mg TbEC Take  by mouth as needed.  TAZORAC 0.1 % topical cream daily.       albuterol (PROVENTIL HFA, VENTOLIN HFA) 90 mcg/actuation inhaler Take 2 Puffs by inhalation every six (6) hours as needed for Wheezing for 360 days. 1 Inhaler 2     Allergies   Allergen Reactions    Sulfa (Sulfonamide Antibiotics) Hives    Vancomycin Rash     Past Medical History:   Diagnosis Date    Anxiety 2/19/2010    Depression 2/19/2010    Environmental allergies 2/19/2010     History reviewed. No pertinent surgical history. Review of Systems    A comprehensive review of systems was negative except for that written in the HPI. Objective:     Visit Vitals  /84 (BP 1 Location: Right arm, BP Patient Position: Sitting)   Pulse 87   Temp 98.9 °F (37.2 °C) (Oral)   Resp 18   Ht 5' 9\" (1.753 m)   Wt 124 lb (56.2 kg)   SpO2 100%   BMI 18.31 kg/m²     General appearance: alert, cooperative, no distress, appears stated age  Head: Normocephalic, without obvious abnormality, atraumatic  Eyes: negative  Back: symmetric, no curvature. ROM normal. No CVA tenderness. Lungs: clear to auscultation bilaterally  Heart: regular rate and rhythm, S1, S2 normal, no murmur, click, rub or gallop  Abdomen: soft, non-tender. Bowel sounds normal. No masses,  no organomegaly  Extremities: extremities normal, atraumatic, no cyanosis or edema  Pulses: 2+ and symmetric  Skin: Skin color, texture, turgor normal. No rashes or lesions  Neurologic: Alert and oriented X 3, normal strength and tone. Normal symmetric reflexes. Normal coordination and gait  Psych: appropriate mood, speech, affect  Nursing note and vitals reviewed  Assessment/Plan:       ICD-10-CM ICD-9-CM    1. Preoperative clearance Z01.818 V72.84    2.  Anxiety F41.9 300.00 clonazePAM (KLONOPIN) 0.5 mg tablet      ALPRAZolam (XANAX) 0.5 mg tablet      escitalopram oxalate (LEXAPRO) 20 mg tablet      REFERRAL TO PSYCHIATRY   3. Current severe episode of major depressive disorder without psychotic features without prior episode ([facility]) F32.2 296.23 REFERRAL TO PSYCHIATRY   4. Neutropenia, unspecified type ([facility]) D70.9 288.00    Preop clearance given, neutropenia has been chronic and evaluated by hematology, as has iron def anemia. She will call to make psych and therapist appointments today. Follow-up Disposition:  Return for As needed. Advised her to call back or return to office if symptoms worsen/change/persist.  Discussed expected course/resolution/complications of diagnosis in detail with patient. Medication risks/benefits/costs/interactions/alternatives discussed with patient. She was given an after visit summary which includes diagnoses, current medications, & vitals. She expressed understanding with the diagnosis and plan.

## 2019-01-08 ENCOUNTER — OFFICE VISIT (OUTPATIENT)
Dept: BEHAVIORAL/MENTAL HEALTH CLINIC | Age: 54
End: 2019-01-08

## 2019-01-08 DIAGNOSIS — F10.10 ALCOHOL ABUSE: ICD-10-CM

## 2019-01-08 DIAGNOSIS — F41.9 ANXIETY: ICD-10-CM

## 2019-01-08 DIAGNOSIS — F32.1 CURRENT MODERATE EPISODE OF MAJOR DEPRESSIVE DISORDER, UNSPECIFIED WHETHER RECURRENT (HCC): Primary | ICD-10-CM

## 2019-01-08 NOTE — PROGRESS NOTES
Outpatient Initial Assessment and Psychotherapy Note           Chief Complaint:     Patient presents with anxiety, depression, employment issues, relationship difficulties and alcohol dependency    History of Present Illness: Marline Redd is a 47 y.o. female who presents with symptoms of depression and anxiety at the recommendation of her PCP. Currently unemployed, she was leaving to visit her parents in [place]; her mother is in a nursing home on hospice with Alzheimer's and her father is living independently, depressed. On her way, had to appear in court regarding DUI she got in [place] in November, spending one night in detention. 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 Left for various reasons, some involving conflicts. Acknowledges her long-standing alcohol problem has contributed in some cases. Marital distance for last five years with lack of intimacy during this time.  of 20 years asked for divorce two years ago, but did not follow through. She recently reconnecting with high school \"boyfirend\" and is in a relationship with him, planning to seek divorce from her . Anxious about need to get another job. She has suicidal ideation at times, but not currently. Desires help in becoming sober. Past Psychiatric History:  Psychotropic edications prescribed by PCP. Brief attempt at addiction treatment. No previous counseling. Previous Suicide Attempts:  None    Trauma History:  Unknown at this time. Social Support:  Male friend in [place]. Substance Abuse History:   Alcohol abuse for several years. DUI 10/18. Current  Medication List:   Current Outpatient Medications   Medication Sig Dispense Refill    ciprofloxacin HCl (CIPRO) 500 mg tablet Take  by mouth two (2) times a day.       clonazePAM (KLONOPIN) 0.5 mg tablet TAKE ONE TABLET BY MOUTH ONE TIME DAILY FOR 30 DAYS 30 Tab 2    ALPRAZolam (XANAX) 0.5 mg tablet Take 1 Tab by mouth daily as needed for Anxiety. 30 Tab 0    escitalopram oxalate (LEXAPRO) 20 mg tablet TAKE ONE TABLET BY MOUTH ONE TIME DAILY 90 Tab 3    traZODone (DESYREL) 50 mg tablet Take 1 Tab by mouth nightly. 30 Tab 5    fluticasone propionate (FLONASE ALLERGY RELIEF NA) by Nasal route.  multivitamin (ONE A DAY) tablet Take 1 Tab by mouth daily.  Dapsone (ACZONE) 5 % gel by Apply Externally route.  doxycycline hyclate 100 mg TbEC Take  by mouth as needed.  TAZORAC 0.1 % topical cream daily.  albuterol (PROVENTIL HFA, VENTOLIN HFA) 90 mcg/actuation inhaler Take 2 Puffs by inhalation every six (6) hours as needed for Wheezing for 360 days. 1 Inhaler 2          Family Psychiatric History:   Family History   Problem Relation Age of Onset    Hypertension Father     Cancer Father 71        skin cancer    Cancer Paternal Grandmother         Breast          Family medical problems:  Family History   Problem Relation Age of Onset    Hypertension Father     Cancer Father 71        skin cancer    Cancer Paternal Grandmother         Breast       Social History:      Social History     Socioeconomic History    Marital status:      Spouse name: Not on file    Number of children: Not on file    Years of education: Not on file    Highest education level: Not on file   Social Needs    Financial resource strain: Not on file    Food insecurity - worry: Not on file    Food insecurity - inability: Not on file   [employer] needs - medical: Not on file   [employer] needs - non-medical: Not on file   Occupational History    Not on file   Tobacco Use    Smoking status: Never Smoker    Smokeless tobacco: Never Used   Substance and Sexual Activity    Alcohol use:  Yes     Alcohol/week: 2.0 oz     Types: 4 Glasses of wine per week    Drug use: No    Sexual activity: Yes     Partners: Male   Other Topics Concern    Not on file   Social History Narrative    Angely Enrique, 48 yo  woman,  21 years to  5 years her bala who works at [employer], 24 yo daughter senior at [employer]. Marital stress with separation. Mother, 76, in hospice with Alzheimer's and father living independently-both in [place]. [place] 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 Allergies: Allergies   Allergen Reactions    Sulfa (Sulfonamide Antibiotics) Hives    Vancomycin Rash          Psychiatric/Mental Status Examination:     MENTAL STATUS EXAM:  Sensorium  Alert and Oriented x 2   Relations cooperative   Eye Contact appropriate   Appearance:  casually dressed and thin & gaunt looking   Motor Behavior:  within normal limits   Speech:  normal pitch and normal volume   Thought Process: logical   Thought Content free of delusions and free of hallucinations   Suicidal ideations none   Homicidal ideations none   Mood:  anxious and depressed   Affect:  mood-congruent   Memory recent  adequate   Memory remote:  adequate   Concentration:  adequate   Abstraction:  abstract   Insight:  fair   Reliability good   Judgment:  fair         Diagnoses:       ICD-10-CM ICD-9-CM    1. Current moderate episode of major depressive disorder, unspecified whether recurrent ([facility]) F32.1 296.22    2. Anxiety F41.9 300.00    3. Alcohol abuse F10.10 305.00        Assessment:  Angely Enrique has a long-standing addiction with alcohol that has contributed to work issues. She is an unsatisfactory marriage and is having an affair, with desire to . Desires help in becoming sober before entering another marriage. Treatment Plan:  Referral for addiction treatment. Will call if desires further psychotherapy. The nature and course of the patient's psychiatric diagnosis were discussed with the patient.   Office and confidentiality policies and procedures were discussed with patient. The patient has my contact information for routine or urgent concerns.       Gerry Toledo LCSW  1/8/2019

## 2019-01-16 NOTE — PROGRESS NOTES
Hematology Consultation Patient: Chelsea Franklin MRN: [id number]  SSN: [id number] YOB: 1965  Age: 47 y.o. Sex: female Subjective:  
  
Chelsea Franklin is a 47 y.o. female who I am seeing in consultation for unspecified anemia. Anemia was noted in routine labs for the last 6 months. Anemia has improved. She feels well. Denies bleeding. Review of Systems: 
 
Constitutional: negative Eyes: negative Ears, Nose, Mouth, Throat, and Face: negative Respiratory: negative Cardiovascular: negative Gastrointestinal: negative Genitourinary:negative Integument/Breast: negative Hematologic/Lymphatic: negative Musculoskeletal:negative Neurological: negative Past Medical History:  
Diagnosis Date  Anxiety 2/19/2010  Depression 2/19/2010  Environmental allergies 2/19/2010 Past Surgical History:  
Procedure Laterality Date  BREAST SURGERY PROCEDURE UNLISTED    
 breast implants  HX ORTHOPAEDIC    
 bursa removal  
  
Family History Problem Relation Age of Onset  Hypertension Father  Cancer Father 71  
     skin cancer  Cancer Paternal Grandmother Breast  
 
Social History Tobacco Use  Smoking status: Never Smoker  Smokeless tobacco: Never Used Substance Use Topics  Alcohol use: Yes Alcohol/week: 2.0 oz Types: 4 Glasses of wine per week Prior to Admission medications Medication Sig Start Date End Date Taking? Authorizing Provider  
fluticasone propionate (FLONASE ALLERGY RELIEF NA) by Nasal route. Yes Provider, Historical  
multivitamin (ONE A DAY) tablet Take 1 Tab by mouth daily. Yes Provider, Historical  
Dapsone (ACZONE) 5 % gel by Apply Externally route. Yes Provider, Historical  
doxycycline hyclate 100 mg TbEC Take  by mouth as needed. Yes Provider, Historical  
TAZORAC 0.1 % topical cream daily.  10/26/15  Yes Provider, Historical  
 ciprofloxacin HCl (CIPRO) 500 mg tablet Take  by mouth two (2) times a day. Provider, Historical  
clonazePAM (KLONOPIN) 0.5 mg tablet TAKE ONE TABLET BY MOUTH ONE TIME DAILY FOR 30 DAYS 12/19/18   Skiff, Cleon Gene, NP  
ALPRAZolam [facility]) 0.5 mg tablet Take 1 Tab by mouth daily as needed for Anxiety. 12/19/18   Skiff, Cleon Gene, NP  
escitalopram oxalate (LEXAPRO) 20 mg tablet TAKE ONE TABLET BY MOUTH ONE TIME DAILY 12/19/18   Skiff, Cleon Gene, NP  
traZODone (DESYREL) 50 mg tablet Take 1 Tab by mouth nightly. 10/22/18   Skiff, Cleon Gene, NP  
albuterol (PROVENTIL HFA, VENTOLIN HFA) 90 mcg/actuation inhaler Take 2 Puffs by inhalation every six (6) hours as needed for Wheezing for 360 days. 1/2/14 12/28/14  Dona Molina MD  
  
 
 
 
 
Allergies Allergen Reactions  Sulfa (Sulfonamide Antibiotics) Hives  Vancomycin Rash Objective:  
 
Vitals:  
 07/05/18 1123 BP: (!) 137/99 Pulse: 67 Resp: 14 Temp: 97.5 °F (36.4 °C) TempSrc: Oral  
SpO2: 99% Weight: 128 lb 3.2 oz (58.2 kg) Height: 5' 9\" (1.753 m) Physical Exam: 
 
GENERAL: alert, cooperative, no distress, appears stated age EYE: conjunctivae/corneas clear. PERRL, EOM's intact. Fundi benign LYMPHATIC: Cervical, supraclavicular, and axillary nodes normal.  
THROAT & NECK: normal and no erythema or exudates noted. LUNG: clear to auscultation bilaterally HEART: regular rate and rhythm, S1, S2 normal, no murmur, click, rub or gallop ABDOMEN: soft, non-tender. Bowel sounds normal. No masses,  no organomegaly EXTREMITIES:  extremities normal, atraumatic, no cyanosis or edema SKIN: Normal. 
NEUROLOGIC: negative Lab Results Component Value Date/Time Iron 251 (H) 07/05/2018 11:53 AM  
 TIBC <268 07/05/2018 11:53 AM  
 Iron % saturation >94 (HH) 07/05/2018 11:53 AM  
 Ferritin 64 06/28/2018 10:00 AM  
 
Lab Results Component Value Date/Time  WBC 2.6 (L) 06/28/2018 10:00 AM  
 HGB 13.5 06/28/2018 10:00 AM  
 HCT 40.4 06/28/2018 10:00 AM  
 PLATELET 505 89/66/8398 10:00 AM  
  (H) 06/28/2018 10:00 AM  
 
 
 
 
Assessment: 1. Anemia, unspecified Anemia has resolved. Iron profile is normal.  
No need to replace iron. Plan: 1. Return as needed. Signed by: Nathaniel Carrington MD 
                   January 16, 2019 CC.  Alejandra Hartley NP

## 2019-02-18 ENCOUNTER — HOSPITAL ENCOUNTER (OUTPATIENT)
Dept: GENERAL RADIOLOGY | Age: 54
Discharge: HOME | End: 2019-02-18
Payer: COMMERCIAL

## 2019-02-18 ENCOUNTER — TELEPHONE (OUTPATIENT)
Dept: INTERNAL MEDICINE CLINIC | Facility: CLINIC | Age: 54
End: 2019-02-18

## 2019-02-18 ENCOUNTER — OFFICE VISIT (OUTPATIENT)
Dept: INTERNAL MEDICINE CLINIC | Facility: CLINIC | Age: 54
End: 2019-02-18

## 2019-02-18 VITALS
HEIGHT: 69 IN | WEIGHT: 124 LBS | SYSTOLIC BLOOD PRESSURE: 128 MMHG | TEMPERATURE: 99 F | RESPIRATION RATE: 16 BRPM | BODY MASS INDEX: 18.37 KG/M2 | HEART RATE: 78 BPM | DIASTOLIC BLOOD PRESSURE: 91 MMHG | OXYGEN SATURATION: 98 %

## 2019-02-18 DIAGNOSIS — M53.3 COCCYX PAIN: Primary | ICD-10-CM

## 2019-02-18 DIAGNOSIS — R03.0 ELEVATED BP WITHOUT DIAGNOSIS OF HYPERTENSION: ICD-10-CM

## 2019-02-18 DIAGNOSIS — F41.9 ANXIETY: ICD-10-CM

## 2019-02-18 DIAGNOSIS — R63.6 UNDERWEIGHT: ICD-10-CM

## 2019-02-18 DIAGNOSIS — M53.3 COCCYX PAIN: ICD-10-CM

## 2019-02-18 PROCEDURE — 72220 X-RAY EXAM SACRUM TAILBONE: CPT

## 2019-02-18 RX ORDER — ALPRAZOLAM 0.5 MG/1
0.5 TABLET ORAL
Qty: 30 TAB | Refills: 0 | Status: SHIPPED | OUTPATIENT
Start: 2019-02-18 | End: 2019-04-01 | Stop reason: SDUPTHER

## 2019-02-18 NOTE — TELEPHONE ENCOUNTER
Patient was called per Vincente Bloch NP in reference to reason for appointment at 3:15 pm today. Patient states she hurt her tailbone 2 nights ago and wants to see if it is broken or bruised. Patient was advised per Vincente Bloch NP to go to Urgent Care where she can get an xray. Patient states her co pays are too high at Urgent Care and she just wants to come here to get checked. Patient verbalized understanding of all information given.   Bucky Mullins LPN

## 2019-02-18 NOTE — PROGRESS NOTES
Identified pt with two pt identifiers(name and ). Reviewed record in preparation for visit and have obtained necessary documentation. Chief Complaint   Patient presents with    Tailbone Pain     for 2 days        Health Maintenance Due   Topic    Pneumococcal 19-64 Medium Risk (1 of 1 - PPSV23)    Shingrix Vaccine Age 50> (1 of 2)    FOBT Q 1 YEAR AGE 50-75     BREAST CANCER SCRN MAMMOGRAM        Coordination of Care Questionnaire:  :   1) Have you been to an emergency room, urgent care, or hospitalized since your last visit?   no   If yes, where when, and reason for visit? 2. Have seen or consulted any other health care provider since your last visit? NO  If yes, where when, and reason for visit? 3) Do you have an Advanced Directive/ Living Will in place? NO  If yes, do we have a copy on file NO  If no, would you like information NO    Patient is accompanied by self I have received verbal consent from Pooja Hernandez to discuss any/all medical information while they are present in the room.     Visit Vitals  BP (!) 128/91 (BP Patient Position: Sitting)   Pulse 78   Temp 99 °F (37.2 °C) (Oral)   Resp 16   Ht 5' 9\" (1.753 m)   Wt 124 lb (56.2 kg)   SpO2 98%   BMI 18.31 kg/m²     Holden Early, LPN

## 2019-02-19 NOTE — PROGRESS NOTES
Called and spoke with pt on 2/18/19 explained per Kip Livers her x ray was negative for fx and that she can take ibuprofen as needed for discomfort. Pt voiced an understanding.  Franchesca Morales LPN

## 2019-04-10 ENCOUNTER — OFFICE VISIT (OUTPATIENT)
Dept: BEHAVIORAL/MENTAL HEALTH CLINIC | Age: 54
End: 2019-04-10

## 2019-04-10 VITALS
HEART RATE: 68 BPM | WEIGHT: 123 LBS | DIASTOLIC BLOOD PRESSURE: 76 MMHG | HEIGHT: 69 IN | SYSTOLIC BLOOD PRESSURE: 113 MMHG | BODY MASS INDEX: 18.22 KG/M2

## 2019-04-10 DIAGNOSIS — F43.9 TRAUMA AND STRESSOR-RELATED DISORDER: ICD-10-CM

## 2019-04-10 DIAGNOSIS — F41.9 ANXIETY: ICD-10-CM

## 2019-04-10 DIAGNOSIS — F33.0 MILD RECURRENT MAJOR DEPRESSION (HCC): Primary | ICD-10-CM

## 2019-04-10 RX ORDER — CLONAZEPAM 0.5 MG/1
TABLET ORAL
Qty: 30 TAB | Refills: 0 | Status: SHIPPED | OUTPATIENT
Start: 2019-04-10

## 2019-04-10 RX ORDER — ALPRAZOLAM 0.5 MG/1
0.5 TABLET ORAL
Qty: 30 TAB | Refills: 0 | Status: SHIPPED | OUTPATIENT
Start: 2019-04-10
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
## 2019-06-27 ENCOUNTER — HOSPITAL ENCOUNTER (OUTPATIENT)
Dept: MAMMOGRAPHY | Age: 54
Discharge: HOME | End: 2019-06-27
Payer: COMMERCIAL

## 2019-06-27 DIAGNOSIS — Z12.39 BREAST SCREENING: ICD-10-CM

## 2019-06-27 PROCEDURE — 77067 SCR MAMMO BI INCL CAD: CPT

## 2020-04-08 DIAGNOSIS — F51.05 INSOMNIA SECONDARY TO ANXIETY: ICD-10-CM

## 2020-04-08 DIAGNOSIS — F41.9 INSOMNIA SECONDARY TO ANXIETY: ICD-10-CM

## 2020-04-08 RX ORDER — TRAZODONE HYDROCHLORIDE 50 MG/1
TABLET ORAL
Qty: 90 TAB | Refills: 2 | OUTPATIENT
Start: 2020-04-08

## 2021-09-08 ENCOUNTER — TRANSCRIBE ORDER (OUTPATIENT)
Dept: SCHEDULING | Age: 56
End: 2021-09-08

## 2021-09-08 DIAGNOSIS — Z12.31 ENCOUNTER FOR MAMMOGRAM TO ESTABLISH BASELINE MAMMOGRAM: Primary | ICD-10-CM

## 2021-12-28 ENCOUNTER — HOSPITAL ENCOUNTER (OUTPATIENT)
Dept: MAMMOGRAPHY | Age: 56
Discharge: HOME | End: 2021-12-28
Payer: COMMERCIAL

## 2021-12-28 DIAGNOSIS — Z12.31 ENCOUNTER FOR MAMMOGRAM TO ESTABLISH BASELINE MAMMOGRAM: ICD-10-CM

## 2021-12-28 PROCEDURE — 77063 BREAST TOMOSYNTHESIS BI: CPT

## 2022-03-19 PROBLEM — M25.552 BILATERAL HIP PAIN: Status: ACTIVE | Noted: 2018-06-07

## 2022-03-19 PROBLEM — D70.9 NEUTROPENIA (HCC): Status: ACTIVE | Noted: 2018-12-19

## 2022-03-19 PROBLEM — M25.551 BILATERAL HIP PAIN: Status: ACTIVE | Noted: 2018-06-07

## 2022-03-20 PROBLEM — D50.9 IRON DEFICIENCY ANEMIA: Status: ACTIVE | Noted: 2018-07-02

## 2022-03-20 PROBLEM — M70.52 PATELLAR BURSITIS OF LEFT KNEE: Status: ACTIVE | Noted: 2018-01-10

## 2023-05-25 RX ORDER — TAZAROTENE 1 MG/G
CREAM TOPICAL DAILY
COMMUNITY
Start: 2015-10-26

## 2023-05-25 RX ORDER — ALBUTEROL SULFATE 90 UG/1
2 AEROSOL, METERED RESPIRATORY (INHALATION) EVERY 6 HOURS PRN
COMMUNITY
Start: 2014-01-02

## 2023-05-25 RX ORDER — ALPRAZOLAM 0.5 MG/1
0.5 TABLET ORAL DAILY PRN
COMMUNITY
Start: 2019-04-10

## 2023-05-25 RX ORDER — CLONAZEPAM 0.5 MG/1
TABLET ORAL
COMMUNITY
Start: 2019-04-10

## 2023-05-25 RX ORDER — CIPROFLOXACIN 500 MG/1
TABLET, FILM COATED ORAL 2 TIMES DAILY
COMMUNITY

## 2023-05-25 RX ORDER — DAPSONE 50 MG/G
GEL TOPICAL
COMMUNITY

## 2023-05-25 RX ORDER — TRAZODONE HYDROCHLORIDE 50 MG/1
1 TABLET ORAL NIGHTLY
COMMUNITY
Start: 2019-08-19

## 2023-05-25 RX ORDER — ESCITALOPRAM OXALATE 20 MG/1
1 TABLET ORAL DAILY
COMMUNITY
Start: 2018-12-19

## 2023-05-25 RX ORDER — DOXYCYCLINE HYCLATE 100 MG/1
TABLET, DELAYED RELEASE ORAL PRN
COMMUNITY

## 2023-09-16 NOTE — PROGRESS NOTES
[employer]  [address] [place] [address], [address] [place] [address] [address]  W: [phone]  F: [address]      Reason for visit:  Evaluation for low wbc    HPI:   Patricia Sarabia is a 49 y.o.  female who I was asked to see in consultation at the request of Dr. Codi Nguyen for evaluation for low wbc. Also with anemia. 5/24/17 wbc 2.5; hgb 10; anc 0.9; TSH   5/30/17 wbc 2.7; hgb 10.6; anc 1.4    In 2010, hgb normal, wbc mildly low. LMP:  Last week, 2 days of very heavy, lasts 5 days. She has no complaints today      DX   Encounter Diagnoses   Name Primary?  Neutropenia, unspecified type ([facility] [address] Yes    Anemia, unspecified type                 Past Medical History:   Diagnosis Date    Anxiety 2/19/2010    Depression 2/19/2010    Environmental allergies 2/19/2010     History reviewed. No pertinent surgical history. Social History     Social History    Marital status:      Spouse name: N/A    Number of children: N/A    Years of education: N/A     Social History Main Topics    Smoking status: Never Smoker    Smokeless tobacco: Never Used    Alcohol use 2.0 oz/week     4 Glasses of wine per week    Drug use: No    Sexual activity: Yes     Partners: Male     Other Topics Concern    None     Social History Narrative     Family History   Problem Relation Age of Onset    Hypertension Father     Cancer Father 71     skin cancer    Cancer Paternal Grandmother      Breast       Current Outpatient Prescriptions   Medication Sig Dispense Refill    multivitamin (ONE A DAY) tablet Take 1 Tab by mouth daily.  escitalopram oxalate (LEXAPRO) 20 mg tablet TAKE ONE TABLET BY MOUTH ONE TIME DAILY 30 Tab 5    clonazePAM (KLONOPIN) 0.5 mg tablet TAKE ONE TABLET BY MOUTH ONE TIME DAILY FOR 30 DAYS 30 Tab 3    Dapsone (ACZONE) 5 % gel by Apply Externally route.  TAZORAC 0.1 % topical cream daily.       ALPRAZolam (XANAX) 0.5 mg tablet Take 1 Tab by mouth daily as needed for Anxiety. 20 Tab 0    propranolol (INDERAL) 10 mg tablet Take 1 Tab by mouth daily as needed for up to 30 doses. 30 Tab 0    doxycycline hyclate 100 mg TbEC Take  by mouth as needed.  albuterol (PROVENTIL HFA, VENTOLIN HFA) 90 mcg/actuation inhaler Take 2 Puffs by inhalation every six (6) hours as needed for Wheezing for 360 days. 1 Inhaler 2       Allergies   Allergen Reactions    Sulfa (Sulfonamide Antibiotics) Hives    Vancomycin Rash       Review of Systems    A comprehensive review of systems was performed and all systems were negative except for HPI. Objective:  Physical Exam:  Visit Vitals    /76    Pulse 75    Temp 97.2 °F (36.2 °C) (Temporal)    Resp 18    Ht 5' 9\" (1.753 m)    Wt 135 lb 12.8 oz (61.6 kg)    LMP 06/03/2017    SpO2 98%    BMI 20.05 kg/m2       General:  Alert, cooperative, no distress, appears stated age. Head:  Normocephalic, without obvious abnormality, atraumatic. Eyes:  Conjunctivae/corneas clear. PERRL, EOMs intact. Throat: Lips, mucosa, and tongue normal.    Neck: Supple, symmetrical, trachea midline, no adenopathy, thyroid: no enlargement/tenderness/nodules   Back:   Symmetric, no curvature. ROM normal. No CVA tenderness. Lungs:   Clear to auscultation bilaterally. Chest wall:  No tenderness or deformity. Heart:  Regular rate and rhythm, S1, S2 normal, no murmur, click, rub or gallop. Abdomen:   Soft, non-tender. Bowel sounds normal. No masses,  No organomegaly. Extremities: Extremities normal, atraumatic, no cyanosis or edema. Skin: Skin color, texture, turgor normal. No rashes or lesions. Lymph nodes: Cervical, supraclavicular, and axillary nodes normal.   Neurologic: CNII-XII intact. Diagnostic Imaging   No results found for this or any previous visit. No results found for this or any previous visit.     Lab Results  Lab Results   Component Value Date/Time    WBC 2.7 05/30/2017 04:10 PM    HGB 10.6 05/30/2017 04:10 PM    HCT 34.3 05/30/2017 04:10 PM    PLATELET 858 02/47/2543 04:10 PM    MCV 87 05/30/2017 04:10 PM       Lab Results   Component Value Date/Time    Sodium 137 05/24/2017 11:16 AM    Potassium 4.1 05/24/2017 11:16 AM    Chloride 98 05/24/2017 11:16 AM    CO2 25 05/24/2017 11:16 AM    Glucose 87 05/24/2017 11:16 AM    BUN 12 05/24/2017 11:16 AM    Creatinine 0.83 05/24/2017 11:16 AM    BUN/Creatinine ratio 14 05/24/2017 11:16 AM    GFR est AA 95 05/24/2017 11:16 AM    GFR est non-AA 82 05/24/2017 11:16 AM    Calcium 9.3 05/24/2017 11:16 AM    AST (SGOT) 14 05/24/2017 11:16 AM    Alk. phosphatase 37 05/24/2017 11:16 AM    Protein, total 6.6 05/24/2017 11:16 AM    Albumin 4.2 05/24/2017 11:16 AM    A-G Ratio 1.8 05/24/2017 11:16 AM    ALT (SGPT) 7 05/24/2017 11:16 AM       .    Assessment/Plan:  49 y.o. female with leukopenia and anemia. Records reviewed and summarized above. PS 0    1. Leukopenia/neutropenia:    The most common etiology, especially in patients without recurrent infections, would be benign ethnic neutropenia, as s commonly have a lower WBC than Caucasians. There is no evidence by history of an inherited neutropenia, no history of chronic or recurrent infection, and no medication that is likely to be contributing. I will explore some other possibilities today with labwork (smear review, ESR, CRP, B12, folate)    If these studies are negative and the neutrophil count remains >1000, then a period of observation may be reasonable, with further evaluation (ie bone marrow biopsy) only if the counts worsen or the patient develops recurrent infections. 2. Anemia:  The differential diagnosis for a normocytic anemia is broad, and includes blood loss, anemia of chronic disease, chronic renal insufficiency, iron deficiency, hypothyroidism, hemolysis, and bone marrow suppression.   MDS, B12 deficiency, folate deficiency, and etoh abuse can also lead to anemia, though it is generally macrocytic. I will obtain some additional labwork today to further investigate, including CBC with differential, retic count, iron profile, ferritin, B12, folate, haptoglobin, LDH. She does have an elevated TSH, and could have iron def due to chronic blood loss from menses. Will check the labs above. May need GI evaluation, bone marrow biopsy. 3. Hypothyroid:  High TSH on 5/24/17; may contribute to #2; she will f/u with Dr. Denita Paula    Thank you for this consult. All of the patient's questions were answered today. Will call her with her labs. There are no Patient Instructions on file for this visit. Follow-up Disposition:  Return in about 3 months (around 9/14/2017).     Rosalee Kathleen MD No

## 2023-11-27 ENCOUNTER — TRANSCRIBE ORDERS (OUTPATIENT)
Facility: HOSPITAL | Age: 58
End: 2023-11-27

## 2023-11-27 DIAGNOSIS — Z12.31 VISIT FOR SCREENING MAMMOGRAM: Primary | ICD-10-CM

## 2024-01-24 ENCOUNTER — HOSPITAL ENCOUNTER (OUTPATIENT)
Facility: HOSPITAL | Age: 59
Discharge: HOME | End: 2024-01-27
Payer: COMMERCIAL

## 2024-01-24 DIAGNOSIS — Z12.31 VISIT FOR SCREENING MAMMOGRAM: ICD-10-CM

## 2024-01-24 PROCEDURE — 77063 BREAST TOMOSYNTHESIS BI: CPT

## 2024-04-16 ENCOUNTER — HOSPITAL ENCOUNTER (OUTPATIENT)
Facility: HOSPITAL | Age: 59
Discharge: HOME | End: 2024-04-19
Payer: COMMERCIAL

## 2024-04-16 DIAGNOSIS — R59.0 SUPRACLAVICULAR LYMPHADENOPATHY: ICD-10-CM

## 2024-04-16 PROCEDURE — 76536 US EXAM OF HEAD AND NECK: CPT

## 2025-02-24 ENCOUNTER — TRANSCRIBE ORDERS (OUTPATIENT)
Facility: HOSPITAL | Age: 60
End: 2025-02-24

## 2025-02-24 DIAGNOSIS — Z12.31 VISIT FOR SCREENING MAMMOGRAM: Primary | ICD-10-CM

## 2025-06-06 NOTE — LETTER
7/2/2018 3:04 PM 
 
[facility]ine [facility] [id number] Dear Nadine Lew: Please find your most recent results below. Resulted Orders TSH AND FREE T4 Result Value Ref Range TSH 3.970 0.450 - 4.500 uIU/mL T4, Free 1.22 0.82 - 1.77 ng/dL Narrative Performed at:  [address]  [id number] : Geovanna Klein MD, Phone:  [phone] ANEMIA PROFILE B Result Value Ref Range TIBC 259 250 - 450 ug/dL UIBC 45 (L) 131 - 425 ug/dL Iron 214 (H) 27 - 159 ug/dL Iron % saturation 83 (HH) 15 - 55 % Ferritin 64 15 - 150 ng/mL Vitamin B12 276 232 - 1245 pg/mL Folate 19.1 >3.0 ng/mL Comment: A serum folate concentration of less than 3.1 ng/mL is 
considered to represent clinical deficiency. WBC 2.6 (L) 3.4 - 10.8 x10E3/uL  
 RBC 3.91 3.77 - 5.28 x10E6/uL HGB 13.5 11.1 - 15.9 g/dL HCT 40.4 34.0 - 46.6 %  (H) 79 - 97 fL  
 MCH 34.5 (H) 26.6 - 33.0 pg  
 MCHC 33.4 31.5 - 35.7 g/dL  
 RDW 13.3 12.3 - 15.4 % PLATELET 491 137 - 380 x10E3/uL NEUTROPHILS 38 Not Estab. % Lymphocytes 46 Not Estab. % MONOCYTES 12 Not Estab. % EOSINOPHILS 3 Not Estab. % BASOPHILS 1 Not Estab. %  
 ABS. NEUTROPHILS 1.0 (L) 1.4 - 7.0 x10E3/uL Abs Lymphocytes 1.2 0.7 - 3.1 x10E3/uL  
 ABS. MONOCYTES 0.3 0.1 - 0.9 x10E3/uL  
 ABS. EOSINOPHILS 0.1 0.0 - 0.4 x10E3/uL  
 ABS. BASOPHILS 0.0 0.0 - 0.2 x10E3/uL IMMATURE GRANULOCYTES 0 Not Estab. %  
 ABS. IMM. GRANS. 0.0 0.0 - 0.1 x10E3/uL Hematology comments: Note:   
   Comment:  
   Verified by microscopic examination. Reticulocyte count 1.1 0.6 - 2.6 % Narrative Performed at:  [address]  [id number] : Geovanna Klein MD, Phone:  [phone] METABOLIC PANEL, COMPREHENSIVE Result Value Ref Range Glucose 77 65 - 99 mg/dL BUN 15 6 - 24 mg/dL Referring Provider: Ale Ruiz MD     Date of Referral: 06/06/2025    Referral To: [facility] to [facility]    Diagnosis: At high risk for breast cancer [Z91.89]     Breast Imaging:  Mammo: 06/06/2025     Creatinine 0.81 0.57 - 1.00 mg/dL GFR est non-AA 84 >59 mL/min/1.73 GFR est AA 97 >59 mL/min/1.73  
 BUN/Creatinine ratio 19 9 - 23 Sodium 142 134 - 144 mmol/L Potassium 3.9 3.5 - 5.2 mmol/L Chloride 101 96 - 106 mmol/L  
 CO2 25 20 - 29 mmol/L Comment: **Please note reference interval change** Calcium 9.2 8.7 - 10.2 mg/dL Protein, total 7.0 6.0 - 8.5 g/dL Albumin 4.4 3.5 - 5.5 g/dL GLOBULIN, TOTAL 2.6 1.5 - 4.5 g/dL A-G Ratio 1.7 1.2 - 2.2 Bilirubin, total 1.0 0.0 - 1.2 mg/dL Alk. phosphatase 37 (L) 39 - 117 IU/L  
 AST (SGOT) 18 0 - 40 IU/L  
 ALT (SGPT) 10 0 - 32 IU/L Narrative Performed at:  [id number] [address]  [id number] : John Christie MD, Phone:  [phone] LIPID PANEL Result Value Ref Range Cholesterol, total 191 100 - 199 mg/dL Triglyceride 52 0 - 149 mg/dL HDL Cholesterol 113 >39 mg/dL VLDL, calculated 10 5 - 40 mg/dL LDL, calculated 68 0 - 99 mg/dL Narrative Performed at:  [id number] [address]  [id number] : John Christie MD, Phone:  [phone] RECOMMENDATIONS: 
Keep up the good work! Follow up with hematology. Please call me if you have any questions: [phone] Sincerely, 
 
 
Claudy Lee NP

## 2025-08-18 ENCOUNTER — TRANSCRIBE ORDERS (OUTPATIENT)
Facility: HOSPITAL | Age: 60
End: 2025-08-18

## 2025-08-18 DIAGNOSIS — Z12.31 VISIT FOR SCREENING MAMMOGRAM: Primary | ICD-10-CM
